# Patient Record
Sex: MALE | Race: WHITE | NOT HISPANIC OR LATINO | Employment: FULL TIME | URBAN - METROPOLITAN AREA
[De-identification: names, ages, dates, MRNs, and addresses within clinical notes are randomized per-mention and may not be internally consistent; named-entity substitution may affect disease eponyms.]

---

## 2021-01-12 ENCOUNTER — APPOINTMENT (EMERGENCY)
Dept: CT IMAGING | Facility: HOSPITAL | Age: 61
DRG: 066 | End: 2021-01-12
Payer: COMMERCIAL

## 2021-01-12 ENCOUNTER — HOSPITAL ENCOUNTER (INPATIENT)
Facility: HOSPITAL | Age: 61
LOS: 1 days | Discharge: HOME | DRG: 066 | End: 2021-01-14
Attending: EMERGENCY MEDICINE | Admitting: INTERNAL MEDICINE
Payer: COMMERCIAL

## 2021-01-12 ENCOUNTER — APPOINTMENT (OUTPATIENT)
Dept: MRI IMAGING | Facility: HOSPITAL | Age: 61
DRG: 066 | End: 2021-01-12
Payer: COMMERCIAL

## 2021-01-12 DIAGNOSIS — I63.431 CEREBROVASCULAR ACCIDENT (CVA) DUE TO EMBOLISM OF RIGHT POSTERIOR CEREBRAL ARTERY (HCC): ICD-10-CM

## 2021-01-12 DIAGNOSIS — R29.90 STROKE-LIKE SYMPTOMS: Primary | ICD-10-CM

## 2021-01-12 DIAGNOSIS — H57.9 VISUAL COMPLAINT: ICD-10-CM

## 2021-01-12 DIAGNOSIS — I63.40 EMBOLIC CEREBRAL INFARCTION (HCC): ICD-10-CM

## 2021-01-12 PROBLEM — F41.9 ANXIETY: Chronic | Status: ACTIVE | Noted: 2021-01-12

## 2021-01-12 LAB
ANION GAP SERPL CALCULATED.3IONS-SCNC: 6 MMOL/L (ref 4–13)
ATRIAL RATE: 73 BPM
BASOPHILS # BLD AUTO: 0.03 THOUSANDS/ΜL (ref 0–0.1)
BASOPHILS NFR BLD AUTO: 0 % (ref 0–1)
BUN SERPL-MCNC: 11 MG/DL (ref 5–25)
CALCIUM SERPL-MCNC: 9 MG/DL (ref 8.3–10.1)
CHLORIDE SERPL-SCNC: 104 MMOL/L (ref 100–108)
CO2 SERPL-SCNC: 27 MMOL/L (ref 21–32)
CREAT SERPL-MCNC: 0.75 MG/DL (ref 0.6–1.3)
EOSINOPHIL # BLD AUTO: 0.02 THOUSAND/ΜL (ref 0–0.61)
EOSINOPHIL NFR BLD AUTO: 0 % (ref 0–6)
ERYTHROCYTE [DISTWIDTH] IN BLOOD BY AUTOMATED COUNT: 12.2 % (ref 11.6–15.1)
GFR SERPL CREATININE-BSD FRML MDRD: 100 ML/MIN/1.73SQ M
GLUCOSE SERPL-MCNC: 119 MG/DL (ref 65–140)
HCT VFR BLD AUTO: 47.5 % (ref 36.5–49.3)
HGB BLD-MCNC: 16.4 G/DL (ref 12–17)
IMM GRANULOCYTES # BLD AUTO: 0.01 THOUSAND/UL (ref 0–0.2)
IMM GRANULOCYTES NFR BLD AUTO: 0 % (ref 0–2)
LYMPHOCYTES # BLD AUTO: 0.97 THOUSANDS/ΜL (ref 0.6–4.47)
LYMPHOCYTES NFR BLD AUTO: 13 % (ref 14–44)
MCH RBC QN AUTO: 31.2 PG (ref 26.8–34.3)
MCHC RBC AUTO-ENTMCNC: 34.5 G/DL (ref 31.4–37.4)
MCV RBC AUTO: 91 FL (ref 82–98)
MONOCYTES # BLD AUTO: 0.45 THOUSAND/ΜL (ref 0.17–1.22)
MONOCYTES NFR BLD AUTO: 6 % (ref 4–12)
NEUTROPHILS # BLD AUTO: 6.31 THOUSANDS/ΜL (ref 1.85–7.62)
NEUTS SEG NFR BLD AUTO: 81 % (ref 43–75)
NRBC BLD AUTO-RTO: 0 /100 WBCS
P AXIS: 60 DEGREES
PLATELET # BLD AUTO: 206 THOUSANDS/UL (ref 149–390)
PMV BLD AUTO: 9.7 FL (ref 8.9–12.7)
POTASSIUM SERPL-SCNC: 4.4 MMOL/L (ref 3.5–5.3)
PR INTERVAL: 136 MS
QRS AXIS: -58 DEGREES
QRSD INTERVAL: 100 MS
QT INTERVAL: 382 MS
QTC INTERVAL: 413 MS
RBC # BLD AUTO: 5.25 MILLION/UL (ref 3.88–5.62)
SODIUM SERPL-SCNC: 137 MMOL/L (ref 136–145)
T WAVE AXIS: 56 DEGREES
VENTRICULAR RATE: 70 BPM
WBC # BLD AUTO: 7.79 THOUSAND/UL (ref 4.31–10.16)

## 2021-01-12 PROCEDURE — 99219 PR INITIAL OBSERVATION CARE/DAY 50 MINUTES: CPT | Performed by: INTERNAL MEDICINE

## 2021-01-12 PROCEDURE — 93010 ELECTROCARDIOGRAM REPORT: CPT | Performed by: INTERNAL MEDICINE

## 2021-01-12 PROCEDURE — 99285 EMERGENCY DEPT VISIT HI MDM: CPT | Performed by: EMERGENCY MEDICINE

## 2021-01-12 PROCEDURE — 70496 CT ANGIOGRAPHY HEAD: CPT

## 2021-01-12 PROCEDURE — 99205 OFFICE O/P NEW HI 60 MIN: CPT | Performed by: PSYCHIATRY & NEUROLOGY

## 2021-01-12 PROCEDURE — 80048 BASIC METABOLIC PNL TOTAL CA: CPT | Performed by: EMERGENCY MEDICINE

## 2021-01-12 PROCEDURE — 70498 CT ANGIOGRAPHY NECK: CPT

## 2021-01-12 PROCEDURE — 70551 MRI BRAIN STEM W/O DYE: CPT

## 2021-01-12 PROCEDURE — 36415 COLL VENOUS BLD VENIPUNCTURE: CPT | Performed by: EMERGENCY MEDICINE

## 2021-01-12 PROCEDURE — G1004 CDSM NDSC: HCPCS

## 2021-01-12 PROCEDURE — 99285 EMERGENCY DEPT VISIT HI MDM: CPT

## 2021-01-12 PROCEDURE — 85025 COMPLETE CBC W/AUTO DIFF WBC: CPT | Performed by: EMERGENCY MEDICINE

## 2021-01-12 PROCEDURE — 93005 ELECTROCARDIOGRAM TRACING: CPT

## 2021-01-12 PROCEDURE — 96374 THER/PROPH/DIAG INJ IV PUSH: CPT

## 2021-01-12 RX ORDER — ASPIRIN 81 MG/1
81 TABLET, CHEWABLE ORAL DAILY
Status: DISCONTINUED | OUTPATIENT
Start: 2021-01-13 | End: 2021-01-14 | Stop reason: HOSPADM

## 2021-01-12 RX ORDER — ONDANSETRON 2 MG/ML
4 INJECTION INTRAMUSCULAR; INTRAVENOUS EVERY 6 HOURS PRN
Status: DISCONTINUED | OUTPATIENT
Start: 2021-01-12 | End: 2021-01-14 | Stop reason: HOSPADM

## 2021-01-12 RX ORDER — ATORVASTATIN CALCIUM 40 MG/1
40 TABLET, FILM COATED ORAL EVERY EVENING
Status: DISCONTINUED | OUTPATIENT
Start: 2021-01-13 | End: 2021-01-14 | Stop reason: HOSPADM

## 2021-01-12 RX ORDER — LORAZEPAM 2 MG/ML
0.5 INJECTION INTRAMUSCULAR ONCE
Status: COMPLETED | OUTPATIENT
Start: 2021-01-12 | End: 2021-01-12

## 2021-01-12 RX ORDER — LORAZEPAM 0.5 MG/1
0.5 TABLET ORAL EVERY 8 HOURS PRN
Status: DISCONTINUED | OUTPATIENT
Start: 2021-01-12 | End: 2021-01-14 | Stop reason: HOSPADM

## 2021-01-12 RX ORDER — ACETAMINOPHEN 325 MG/1
650 TABLET ORAL EVERY 6 HOURS PRN
Status: DISCONTINUED | OUTPATIENT
Start: 2021-01-12 | End: 2021-01-14 | Stop reason: HOSPADM

## 2021-01-12 RX ORDER — ASPIRIN 81 MG/1
324 TABLET, CHEWABLE ORAL ONCE
Status: COMPLETED | OUTPATIENT
Start: 2021-01-12 | End: 2021-01-12

## 2021-01-12 RX ORDER — LORAZEPAM 2 MG/ML
1 INJECTION INTRAMUSCULAR ONCE
Status: COMPLETED | OUTPATIENT
Start: 2021-01-12 | End: 2021-01-12

## 2021-01-12 RX ADMIN — SODIUM CHLORIDE, SODIUM LACTATE, POTASSIUM CHLORIDE, AND CALCIUM CHLORIDE 500 ML: .6; .31; .03; .02 INJECTION, SOLUTION INTRAVENOUS at 13:58

## 2021-01-12 RX ADMIN — ASPIRIN 81 MG CHEWABLE TABLET 324 MG: 81 TABLET CHEWABLE at 13:56

## 2021-01-12 RX ADMIN — IOHEXOL 100 ML: 350 INJECTION, SOLUTION INTRAVENOUS at 11:20

## 2021-01-12 RX ADMIN — ENOXAPARIN SODIUM 40 MG: 40 INJECTION SUBCUTANEOUS at 18:22

## 2021-01-12 RX ADMIN — LORAZEPAM 0.5 MG: 2 INJECTION INTRAMUSCULAR; INTRAVENOUS at 12:55

## 2021-01-12 RX ADMIN — LORAZEPAM 1 MG: 2 INJECTION INTRAMUSCULAR; INTRAVENOUS at 20:09

## 2021-01-12 NOTE — ASSESSMENT & PLAN NOTE
· Extremely anxious, which is situational as he has not been in the hospital and is anxious about being in the hospital at this time  · Ativan p r n

## 2021-01-12 NOTE — ASSESSMENT & PLAN NOTE
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

## 2021-01-12 NOTE — ED PROVIDER NOTES
History  Chief Complaint   Patient presents with    CVA/TIA-like Symptoms     c/o left visual blurriness, lightheadedness after experiencing a headache on Friday night  Pt also c/o on/off lower lip numbness and LUE "weirdness" since 0100 this morning  Pt denies HA or lip numbness at present time  62 yr male-- with no pmh- this past sat with episode of central clouding vision -- no eye pain- flashes/ floaters-- no amaurosis/ no visual field cut-- awhile lateral with gradual frontal headache-- non thunderclap - that resolved with 2 otc ibuprofen-- no hx of headache syndromes--  also c/o intermitent lightheadedness- no near syncope- vertigo-- no cp/sob/palp/ neck pain/ neck injury -- also with sense of tingling aroudnd left side of mouth -- which has reaolved- no facial weakness- no focal weakness- no other comps       History provided by:  Patient   used: No        None       History reviewed  No pertinent past medical history  History reviewed  No pertinent surgical history  History reviewed  No pertinent family history  I have reviewed and agree with the history as documented  E-Cigarette/Vaping     E-Cigarette/Vaping Substances     Social History     Tobacco Use    Smoking status: Never Smoker    Smokeless tobacco: Never Used   Substance Use Topics    Alcohol use: Not Currently    Drug use: Never       Review of Systems   Constitutional: Negative  HENT: Negative  Eyes: Positive for visual disturbance  Negative for photophobia, pain, discharge, redness and itching  Respiratory: Negative  Cardiovascular: Negative  Gastrointestinal: Negative  Endocrine: Negative  Genitourinary: Negative  Musculoskeletal: Negative  Skin: Negative  Allergic/Immunologic: Negative  Neurological: Positive for light-headedness and headaches  Negative for dizziness, tremors, seizures, syncope, facial asymmetry, speech difficulty, weakness and numbness     Hematological: Negative  Psychiatric/Behavioral: Negative  Physical Exam  Physical Exam  Vitals signs and nursing note reviewed  Constitutional:       General: He is not in acute distress  Appearance: Normal appearance  He is not ill-appearing, toxic-appearing or diaphoretic  Comments: avss-- htnsive-- pulse ox 96 % on ra- interpretation is normal- no intervention - well appearing- in nad    HENT:      Head: Normocephalic and atraumatic  Comments: No bilateral maxillary/fontal sinus tendenrness- edema/ erythema- no tmj tenderness- no bilateral temporal artery area tenderness/edema/ erythema/ nodularity      Right Ear: Tympanic membrane, ear canal and external ear normal  There is no impacted cerumen  Left Ear: Tympanic membrane, ear canal and external ear normal  There is no impacted cerumen  Nose: Nose normal  No congestion or rhinorrhea  Mouth/Throat:      Mouth: Mucous membranes are moist       Pharynx: Oropharynx is clear  No oropharyngeal exudate or posterior oropharyngeal erythema  Eyes:      General: No scleral icterus  Right eye: No discharge  Left eye: No discharge  Extraocular Movements: Extraocular movements intact  Conjunctiva/sclera: Conjunctivae normal       Pupils: Pupils are equal, round, and reactive to light  Comments: Mm pink=- no papilledema bilaterally    Neck:      Musculoskeletal: Normal range of motion and neck supple  No neck rigidity or muscular tenderness  Vascular: No carotid bruit  Comments: No pmt c/t/l/s spine - no carotid bruits  Cardiovascular:      Rate and Rhythm: Normal rate and regular rhythm  Pulses: Normal pulses  Heart sounds: Normal heart sounds  No murmur  No gallop  Pulmonary:      Effort: Pulmonary effort is normal  No respiratory distress  Breath sounds: Normal breath sounds  No stridor  No wheezing, rhonchi or rales  Chest:      Chest wall: No tenderness     Abdominal:      General: Bowel sounds are normal  There is no distension  Palpations: Abdomen is soft  There is no mass  Tenderness: There is no abdominal tenderness  There is no right CVA tenderness, left CVA tenderness, guarding or rebound  Hernia: No hernia is present  Comments: Soft nt/nd- no hsm/ no cva tendenrness- no peritoneal signs- no pulsatile abd mass/bruit/ tenderness   Musculoskeletal: Normal range of motion  General: No swelling, tenderness, deformity or signs of injury  Right lower leg: No edema  Left lower leg: No edema  Comments: Equal bilateral radial/dp pulses- no ble edema/calf tenderness/asym/ erythema   Lymphadenopathy:      Cervical: No cervical adenopathy  Skin:     General: Skin is warm  Capillary Refill: Capillary refill takes less than 2 seconds  Coloration: Skin is not jaundiced or pale  Findings: No bruising, erythema, lesion or rash  Neurological:      General: No focal deficit present  Mental Status: He is alert and oriented to person, place, and time  Mental status is at baseline  Cranial Nerves: No cranial nerve deficit  Sensory: No sensory deficit  Motor: No weakness        Coordination: Coordination normal       Gait: Gait normal       Deep Tendon Reflexes: Reflexes normal       Comments: No nystagmus/ neg test of sckew/  Normal finger to nose  Heal to shin all bilaterally / normal gait- no clonus-- normal visual fields to confrontation    Psychiatric:         Behavior: Behavior normal       Comments: Mildly anxious          Vital Signs  ED Triage Vitals [01/12/21 0905]   Temperature Pulse Respirations Blood Pressure SpO2   98 7 °F (37 1 °C) 78 18 (!) 187/99 96 %      Temp Source Heart Rate Source Patient Position - Orthostatic VS BP Location FiO2 (%)   Oral Monitor Lying Left arm --      Pain Score       --           Vitals:    01/12/21 0905   BP: (!) 187/99   Pulse: 78   Patient Position - Orthostatic VS: Lying Visual Acuity      ED Medications  Medications   lactated ringers bolus 500 mL (has no administration in time range)       Diagnostic Studies  Results Reviewed     Procedure Component Value Units Date/Time    CBC and differential [[id number]] Collected: 01/12/21 1009    Lab Status: No result Specimen: Blood from Arm, Right     Basic metabolic panel [[id number]] Collected: 01/12/21 1009    Lab Status: No result Specimen: Blood from Arm, Right                  CTA head and neck w wo contrast    (Results Pending)              Procedures  Procedures         ED Course  ED Course as of Jan 17 1247   Tue Jan 12, 2021   1212 Er md note- case d/w-  dr Keren Givens - neurology on call - who reviewed  scans and would recommend admission for workup       1304 - ER MD NOTE- DISCUSSED CT SCAN FINDINGS AND NEUROLOGY RECOMMENDATIONS FOR ADMIT AND WORKUP--- AFTER DISCUSSION PT BECAME EXTREMELY ANXIOUS--  /NEAR SYNCOPE- WITH NSR ON MONITOR--  PT THEN C/O LEFT ARM NUMBNESS--  PT GIVEN ATIVAN IV -- AND WILL RE-EVAL       1339 - ER MD NOTE- PT - RE-EVALUATED - RESTING IN NAD- WIFE AWARE OF PENDING ADMIT                                               MDM    Disposition  Final diagnoses:   None     ED Disposition     None      Follow-up Information    None         Patient's Medications    No medications on file     No discharge procedures on file      PDMP Review     None          ED Provider  Electronically Signed by           Hemalatha Kenney MD  01/12/21 1201 [facility]  01/17/21 5832

## 2021-01-12 NOTE — ED PROCEDURE NOTE
PROCEDURE  ECG 12 Lead Documentation Only    Date/Time: 1/12/2021 10:06 AM  Performed by: Sarika Bardales MD  Authorized by: Sarika Bardales MD     Indications / Diagnosis:  Lightheadedness  ECG reviewed by me, the ED Provider: yes    Patient location:  ED and bedside  Previous ECG:     Previous ECG:  Unavailable    Comparison to cardiac monitor: Yes    Interpretation:     Interpretation: non-specific    Rate:     ECG rate:  70    ECG rate assessment: normal    Rhythm:     Rhythm: sinus rhythm    Ectopy:     Ectopy: none    QRS:     QRS axis:  Left    QRS intervals:  Normal  Conduction:     Conduction: abnormal      Abnormal conduction: incomplete RBBB    ST segments:     ST segments:  Normal  T waves:     T waves: flattening      Flattening:  V1 and aVL  Q waves:     Q waves:  V1  Other findings:     Other findings: U wave    Comments:      No ecg signs of ischemia/ injury         Sarika Bardales MD  01/12/21 1010

## 2021-01-12 NOTE — PLAN OF CARE
Problem: PAIN - ADULT  Goal: Verbalizes/displays adequate comfort level or baseline comfort level  Description: Interventions:  - Encourage patient to monitor pain and request assistance  - Assess pain using appropriate pain scale  - Administer analgesics based on type and severity of pain and evaluate response  - Implement non-pharmacological measures as appropriate and evaluate response  - Consider cultural and social influences on pain and pain management  - Notify physician/advanced practitioner if interventions unsuccessful or patient reports new pain  Outcome: Progressing     Problem: INFECTION - ADULT  Goal: Absence or prevention of progression during hospitalization  Description: INTERVENTIONS:  - Assess and monitor for signs and symptoms of infection  - Monitor lab/diagnostic results  - Monitor all insertion sites, i e  indwelling lines, tubes, and drains  - Monitor endotracheal if appropriate and nasal secretions for changes in amount and color  - [place] appropriate cooling/warming therapies per order  - Administer medications as ordered  - Instruct and encourage patient and family to use good hand hygiene technique  - Identify and instruct in appropriate isolation precautions for identified infection/condition  Outcome: Progressing  Goal: Absence of fever/infection during neutropenic period  Description: INTERVENTIONS:  - Monitor WBC    Outcome: Progressing     Problem: SAFETY ADULT  Goal: Patient will remain free of falls  Description: INTERVENTIONS:  - Assess patient frequently for physical needs  -  Identify cognitive and physical deficits and behaviors that affect risk of falls    -  [place] fall precautions as indicated by assessment   - Educate patient/family on patient safety including physical limitations  - Instruct patient to call for assistance with activity based on assessment  - Modify environment to reduce risk of injury  - Consider OT/PT consult to assist with strengthening/mobility  Outcome: Progressing  Goal: Maintain or return to baseline ADL function  Description: INTERVENTIONS:  -  Assess patient's ability to carry out ADLs; assess patient's baseline for ADL function and identify physical deficits which impact ability to perform ADLs (bathing, care of mouth/teeth, toileting, grooming, dressing, etc )  - Assess/evaluate cause of self-care deficits   - Assess range of motion  - Assess patient's mobility; develop plan if impaired  - Assess patient's need for assistive devices and provide as appropriate  - Encourage maximum independence but intervene and supervise when necessary  - Involve family in performance of ADLs  - Assess for home care needs following discharge   - Consider OT consult to assist with ADL evaluation and planning for discharge  - Provide patient education as appropriate  Outcome: Progressing  Goal: Maintain or return mobility status to optimal level  Description: INTERVENTIONS:  - Assess patient's baseline mobility status (ambulation, transfers, stairs, etc )    - Identify cognitive and physical deficits and behaviors that affect mobility  - Identify mobility aids required to assist with transfers and/or ambulation (gait belt, sit-to-stand, lift, walker, cane, etc )  - [place] fall precautions as indicated by assessment  - Record patient progress and toleration of activity level on Mobility SBAR; progress patient to next Phase/Stage  - Instruct patient to call for assistance with activity based on assessment  - Consider rehabilitation consult to assist with strengthening/weightbearing, etc   Outcome: Progressing     Problem: DISCHARGE PLANNING  Goal: Discharge to home or other facility with appropriate resources  Description: INTERVENTIONS:  - Identify barriers to discharge w/patient and caregiver  - Arrange for needed discharge resources and transportation as appropriate  - Identify discharge learning needs (meds, wound care, etc )  - Arrange for interpretive services to assist at discharge as needed  - Refer to Case Management Department for coordinating discharge planning if the patient needs post-hospital services based on physician/advanced practitioner order or complex needs related to functional status, cognitive ability, or social support system  Outcome: Progressing     Problem: Knowledge Deficit  Goal: Patient/family/caregiver demonstrates understanding of disease process, treatment plan, medications, and discharge instructions  Description: Complete learning assessment and assess knowledge base    Interventions:  - Provide teaching at level of understanding  - Provide teaching via preferred learning methods  Outcome: Progressing

## 2021-01-12 NOTE — CONSULTS
Consultation - Neurology   Cristiana Castillo 62 y o  male MRN: [id number]  Unit/Bed#: S -01 Encounter: [id number]      Assessment/Plan   * Stroke-like symptoms  Assessment & Plan  Cristiana Castillo is a 62 y o  male with anxiety who presents to [facility] ED on 01/12/2021 with blurry vision of left eye  Strong suspicion for right PCA infarct given CT head findings and evidence of left homonymous hemianopsia on exam  Will admit on stroke pathway and obtain MRI brain  Plan:  - MRI brain pending  - Recommend Echo  - Recommend:  Lipid panel, hemoglobin A1c  - S/p  mg x 1  - Continue on ASA 81 mg daily starting 01/13/2021  - Initiate atorvastatin 40 mg daily  - Normotension, euglycemia, normothermia  - Telemetry  - Frequent neuro checks  - PT/OT/speech  - Stroke education  - Medical management and supportive care per primary team, notify with changes  - Will likely need a [employer] form to be completed    Imaging/Labs:  - CTA head and neck 01/12/2021:  · Questionable early ischemia/infarct in the right PCA territory involving the right medial occipital lobe  · 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  · Mild cervical carotid atherosclerotic disease    Anxiety  Assessment & Plan  - S/p IV Ativan 0 5 mg x 1  - Medical management per primary team    Recommendations for outpatient neurological follow up have yet to be determined  History of Present Illness     Reason for Consult / Principal Problem:  Stroke like symptoms    HPI: Cristiana Castillo is a 62 y o   male with anxiety who presents to [facility] ED on 01/12/2021 with blurry vision of left eye  Patient reports on Saturday 01/09/2021 patient developed significant blurriness in left eye, with some blurriness in right eye, as well as a headache  Patient reported the headache was behind his right eye and in the posterior aspect his head on the right    He states that the headache was 4/10 in severity and improved with Advil  Patient states he felt his blurry vision also improved with the Advil  Patient reports that headache was only present on Saturday 01/09, however his blurry vision has persisted  Patient reports he developed left upper extremity numbness earlier today 01/12/2021, which improved by the time he got to the ED  Denies any focal weakness, cardiac history, episodes of abnormal heart rhythms/tachycardia, palpitations  Patient reports that he has a PCP, however has not seen his PCP in the last 2 years  Patient states that his blood pressure is on the higher and, however does not routine measure his pressures at home  Denies headache history  Patient presented to [facility] ED on 01/12/2021 with an elevated BP of 187/99  CTA head and neck revealed questionable early ischemia/infarct in the right PCA territory involving right medial occipital lobe, as well as abrupt termination of the right proximal P2 segment of the posterior cerebral artery  On exam patient initially denied any visual deficits, however on examination patient realized he had the left visual field deficits  Patient currently denies CP, SOB, headache, dizziness, N/V, abdominal pain, weakness or numbness  Inpatient consult to Neurology  Consult performed by: Sandra Ware PA-C  Consult ordered by: Nancy Croft PA-C        Review of Systems  12 point ROS performed, as stated above, all others negative  Historical Information   History reviewed  No pertinent past medical history  History reviewed  No pertinent surgical history  Social History   Social History     Substance and Sexual Activity   Alcohol Use Not Currently     Social History     Substance and Sexual Activity   Drug Use Never     E-Cigarette/Vaping     E-Cigarette/Vaping Substances     Social History     Tobacco Use   Smoking Status Never Smoker   Smokeless Tobacco Never Used     Family History: History reviewed   No pertinent family history  Review of previous medical records was completed  Meds/Allergies   all current active meds have been reviewed, current meds:   Current Facility-Administered Medications   Medication Dose Route Frequency    acetaminophen (TYLENOL) tablet 650 mg  650 mg Oral Q6H PRN    [START ON 1/13/2021] aspirin chewable tablet 81 mg  81 mg Oral Daily    [START ON 1/13/2021] atorvastatin (LIPITOR) tablet 40 mg  40 mg Oral QPM    enoxaparin (LOVENOX) subcutaneous injection 40 mg  40 mg Subcutaneous Daily    LORazepam (ATIVAN) injection 1 mg  1 mg Intravenous Once    LORazepam (ATIVAN) tablet 0 5 mg  0 5 mg Oral Q8H PRN    ondansetron (ZOFRAN) injection 4 mg  4 mg Intravenous Q6H PRN   , PTA meds:   None    and     No Known Allergies    Objective   Vitals:Blood pressure 158/96, pulse 65, temperature (!) 97 4 °F (36 3 °C), temperature source Oral, resp  rate 18, height 6' (1 829 m), SpO2 96 %  ,There is no height or weight on file to calculate BMI  No intake or output data in the 24 hours ending 01/12/21 5113    Invasive Devices: Invasive Devices     Peripheral Intravenous Line            Peripheral IV 01/12/21 Right Antecubital less than 1 day                Physical Exam  Vitals signs and nursing note reviewed  Constitutional:       General: He is not in acute distress  Appearance: Normal appearance  He is normal weight  He is not ill-appearing, toxic-appearing or diaphoretic  HENT:      Head: Normocephalic and atraumatic  Eyes:      General: No scleral icterus  Right eye: No discharge  Left eye: No discharge  Extraocular Movements: Extraocular movements intact  Conjunctiva/sclera: Conjunctivae normal       Pupils: Pupils are equal, round, and reactive to light  Neck:      Musculoskeletal: Normal range of motion and neck supple  Cardiovascular:      Comments: Normal rate, regular rhythm on telemetry  Pulmonary:      Effort: No respiratory distress  Musculoskeletal: Normal range of motion  Skin:     General: Skin is warm and dry  Coloration: Skin is not jaundiced or pale  Findings: No bruising, erythema, lesion or rash  Neurological:      Mental Status: He is alert  Psychiatric:      Comments: Anxious on exam       Neurologic Exam     Mental Status   Patient is alert, sitting up in bed  Oriented x3  No dysarthria or aphasia noted  Able to name all objects provided, follows multistep commands, and answers all questions appropriately  Cranial Nerves     CN III, IV, VI   Pupils are equal, round, and reactive to light  Nystagmus: none   Upgaze: normal  Downgaze: normal  Conjugate gaze: present    CN V   Facial sensation intact  CN VII   Facial expression full, symmetric  CN VIII   Hearing: intact    CN XI   CN XI normal      CN XII   Tongue deviation: none  Left homonymous hemianopsia noted on exam     Motor Exam   Muscle bulk: normal  Overall muscle tone: normal  Right arm pronator drift: absent  Left arm pronator drift: absent   strength symmetric, 5/5 bilaterally  Bilateral upper and lower extremity strength testing 5/5 throughout     Sensory Exam   Light touch normal    Temperature sensation intact throughout     Gait, Coordination, and Reflexes     Tremor   Resting tremor: absent    Reflexes   Right ankle clonus: absent  Left ankle clonus: absent  No ataxia or dysmetria on bilateral finger-to-nose testing  Bilateral upper extremity reflexes 1+ throughout  Bilateral patellar reflexes 2+  Bilateral Achilles reflexes trace  Gait WNL     Lab Results: I have personally reviewed pertinent reports    Recent Results (from the past 24 hour(s))   ECG 12 lead    Collection Time: 01/12/21 10:01 AM   Result Value Ref Range    Ventricular Rate 70 BPM    Atrial Rate 73 BPM    NV Interval 136 ms    QRSD Interval 100 ms    QT Interval 382 ms    QTC Interval 413 ms    P Axis 60 degrees    QRS Axis -58 degrees    T Wave Axis 56 degrees   CBC and differential    Collection Time: 01/12/21 10:09 AM   Result Value Ref Range    WBC 7 79 4 31 - 10 16 Thousand/uL    RBC 5 25 3 88 - 5 62 Million/uL    Hemoglobin 16 4 12 0 - 17 0 g/dL    Hematocrit 47 5 36 5 - 49 3 %    MCV 91 82 - 98 fL    MCH 31 2 26 8 - 34 3 pg    MCHC 34 5 31 4 - 37 4 g/dL    RDW 12 2 11 6 - 15 1 %    MPV 9 7 8 9 - 12 7 fL    Platelets 252 505 - 417 Thousands/uL    nRBC 0 /100 WBCs    Neutrophils Relative 81 (H) 43 - 75 %    Immat GRANS % 0 0 - 2 %    Lymphocytes Relative 13 (L) 14 - 44 %    Monocytes Relative 6 4 - 12 %    Eosinophils Relative 0 0 - 6 %    Basophils Relative 0 0 - 1 %    Neutrophils Absolute 6 31 1 85 - 7 62 Thousands/µL    Immature Grans Absolute 0 01 0 00 - 0 20 Thousand/uL    Lymphocytes Absolute 0 97 0 60 - 4 47 Thousands/µL    Monocytes Absolute 0 45 0 17 - 1 22 Thousand/µL    Eosinophils Absolute 0 02 0 00 - 0 61 Thousand/µL    Basophils Absolute 0 03 0 00 - 0 10 Thousands/µL   Basic metabolic panel    Collection Time: 01/12/21 10:09 AM   Result Value Ref Range    Sodium 137 136 - 145 mmol/L    Potassium 4 4 3 5 - 5 3 mmol/L    Chloride 104 100 - 108 mmol/L    CO2 27 21 - 32 mmol/L    ANION GAP 6 4 - 13 mmol/L    BUN 11 5 - 25 mg/dL    Creatinine 0 75 0 60 - 1 30 mg/dL    Glucose 119 65 - 140 mg/dL    Calcium 9 0 8 3 - 10 1 mg/dL    eGFR 100 ml/min/1 73sq m   ]  Imaging Studies: I have personally reviewed pertinent reports and I have personally reviewed pertinent films in PACS  EKG, Pathology, and Other Studies: I have personally reviewed pertinent reports      VTE Prophylaxis:  Will be placed on DVT prophylaxis once admitted to the floor

## 2021-01-12 NOTE — H&P
[address] - Internal Medicine Service  H&P- Wendy Dye 1960, 62 y o  male MRN: [id number]  Unit/Bed#: FT 02 Encounter: [id number]  Primary Care Provider: Noelle Moralez DO   Date and time admitted to hospital: 1/12/2021  9:11 AM    * Stroke-like symptoms  Assessment & Plan  · Initially presented with headache, left eye visual abnormality and left arm weakness with some left lip tingling  Majority of his symptoms have resolved besides some left eye blurriness  · CTA: "Questionable early ischemia/infarction in the right PCA territory involving the right medial occipital lobe  Follow-up MR imaging is recommended  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  Mild cervical carotid atherosclerotic disease "   Stroke Pathway   o Normotensive BP is acceptable  o Neuro checks  o Telemetry  o Check lipid panel and A1c   o Start Aspirin and statin   Obtain MRI brain  [facility]  Anxiety  Assessment & Plan  · Extremely anxious, which is situational as he has not been in the hospital and is anxious about being in the hospital at this time  · Ativan p r n  VTE Prophylaxis: Enoxaparin (Lovenox)  / sequential compression device   Code Status: FULL CODE  POLST: POLST form is not discussed and not completed at this time  Discussion with family: patient and wife at bedside     Anticipated Length of Stay:  Patient will be admitted on an Observation basis with an anticipated length of stay of < 2 midnights  Justification for Hospital Stay: stroke like symptoms with abnormal CTA    Total Time for Visit, including Counseling / Coordination of Care: 1 hour  Greater than 50% of this total time spent on direct patient counseling and coordination of care      Chief Complaint:   Left eye blurriness    History of Present Illness:    Wendy Dye is a 62 y o  male who presents with onset of left eye blurriness as well as headache, left lip tingling and some left arm numbness  All of his symptoms started on Sunday and have resolved, however he does have some left eye blurriness at this point  Reports resolution in his headache  Denies any presyncopal symptoms, syncope, chest pain, lightheadedness, dizziness  Reports he does feel anxious at this time  No history of atrial fibrillation or blood clotting  Has never had neurologic symptoms like this in the past   He does not take medications and is otherwise healthy  Review of Systems:    Review of Systems   Constitutional: Negative for activity change, appetite change, chills, fatigue and fever  HENT: Negative for congestion, rhinorrhea, sinus pressure and sore throat  Eyes: Positive for visual disturbance  Negative for photophobia and pain  Respiratory: Negative for cough, shortness of breath and wheezing  Cardiovascular: Negative for chest pain, palpitations and leg swelling  Gastrointestinal: Negative for abdominal distention, abdominal pain, constipation, diarrhea, nausea and vomiting  Endocrine: Negative for cold intolerance, heat intolerance, polydipsia and polyuria  Genitourinary: Negative for difficulty urinating, dysuria, flank pain, frequency and hematuria  Musculoskeletal: Negative for arthralgias, back pain and joint swelling  Skin: Negative for color change, pallor and rash  Allergic/Immunologic: Negative  Neurological: Positive for numbness and headaches  Negative for dizziness, syncope, weakness and light-headedness  Hematological: Negative  Psychiatric/Behavioral: Negative  Past Medical and Surgical History:     History reviewed  No pertinent past medical history  History reviewed  No pertinent surgical history  Meds/Allergies:    Prior to Admission medications    Not on File     I have reviewed home medications with patient personally      Allergies: No Known Allergies    Social History: Marital Status: /Civil Union   Occupation: non-contributory  Patient Pre-hospital Living Situation: home  Patient Pre-hospital Level of Mobility: full  Patient Pre-hospital Diet Restrictions: none  Substance Use History:   Social History     Substance and Sexual Activity   Alcohol Use Not Currently     Social History     Tobacco Use   Smoking Status Never Smoker   Smokeless Tobacco Never Used     Social History     Substance and Sexual Activity   Drug Use Never       Family History:    non-contributory    Physical Exam:     Vitals:   Blood Pressure: 148/67 (01/12/21 1300)  Pulse: 66 (01/12/21 1300)  Temperature: 98 7 °F (37 1 °C) (01/12/21 0905)  Temp Source: Oral (01/12/21 0905)  Respirations: 16 (01/12/21 1259)  Height: 6' (182 9 cm) (01/12/21 0905)  SpO2: 95 % (01/12/21 1300)    Physical Exam  Vitals signs and nursing note reviewed  Constitutional:       General: He is not in acute distress  Appearance: Normal appearance  HENT:      Head: Normocephalic and atraumatic  Mouth/Throat:      Mouth: Mucous membranes are moist    Eyes:      General: No scleral icterus  Extraocular Movements: Extraocular movements intact  Pupils: Pupils are equal, round, and reactive to light  Neck:      Musculoskeletal: Normal range of motion and neck supple  Cardiovascular:      Rate and Rhythm: Normal rate and regular rhythm  Heart sounds: Normal heart sounds  No murmur  No friction rub  Pulmonary:      Effort: Pulmonary effort is normal  No respiratory distress  Breath sounds: Normal breath sounds  No wheezing, rhonchi or rales  Abdominal:      General: Bowel sounds are normal  There is no distension  Palpations: Abdomen is soft  Tenderness: There is no abdominal tenderness  There is no guarding  Musculoskeletal:         General: No swelling or deformity  Right lower leg: No edema  Left lower leg: No edema  Skin:     General: Skin is warm and dry        Capillary Refill: Capillary refill takes less than 2 seconds  Coloration: Skin is not jaundiced or pale  Neurological:      General: No focal deficit present  Mental Status: He is alert and oriented to person, place, and time  Mental status is at baseline  Additional Data:     Lab Results: I have personally reviewed pertinent reports  Results from last 7 days   Lab Units 01/12/21  1009   WBC Thousand/uL 7 79   HEMOGLOBIN g/dL 16 4   HEMATOCRIT % 47 5   PLATELETS Thousands/uL 206   NEUTROS PCT % 81*   LYMPHS PCT % 13*   MONOS PCT % 6   EOS PCT % 0     Results from last 7 days   Lab Units 01/12/21  1009   SODIUM mmol/L 137   POTASSIUM mmol/L 4 4   CHLORIDE mmol/L 104   CO2 mmol/L 27   BUN mg/dL 11   CREATININE mg/dL 0 75   ANION GAP mmol/L 6   CALCIUM mg/dL 9 0   GLUCOSE RANDOM mg/dL 119                       Imaging: I have personally reviewed pertinent reports  CTA head and neck w wo contrast   Final Result by Maris Campbell MD (01/12 3151)      Questionable early ischemia/infarction in the right PCA territory involving the right medial occipital lobe  Follow-up MR imaging is recommended  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

## 2021-01-12 NOTE — ASSESSMENT & PLAN NOTE
Keaton Hernandez is a 62 y o  male with anxiety who presents to [facility] ED on 01/12/2021 with blurry vision of left eye  Acute right occipital cortical infarcts noted on MRI brain with evidence of patchy left homonymous hemianopsia on exam   CTA head and neck without critical stenosis, less likely atheroembolic  Etiology likely cardioembolic, will likely obtain TIFFANY to rule out central thrombus/PFO  Patient will need loop monitor placement within 1 week of discharge  Plan:  - TIFFANY order placed, plan for 01/14/2021; patient NPO at midnight  - Pending: Thrombosis panel  - S/p  mg x 1  - Continue on ASA 81 mg daily starting 01/13/2021  - Continue atorvastatin 40 mg daily  - Normotension, euglycemia, normothermia  - Telemetry  - Frequent neuro checks  - PT/OT/speech  - Stroke education  - Medical management and supportive care per primary team, notify with changes  - [employer] form completed and faxed on 01/13/2021  - Patient will need to be evaluated by Ophthalmology in the outpatient setting to be cleared to drive  - Will need Outpatient OT Fitness to Drive evaluation on discharge, order placed     Imaging/Labs:  - CTA head and neck 01/12/2021:  · Questionable early ischemia/infarct in the right PCA territory involving the right medial occipital lobe  · 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  · Mild cervical carotid atherosclerotic disease  - MRI brain:  · Acute right occipital cortical infarcts with general swelling    No mass effect or hemorrhagic transformation  · Redemonstrated occluded right PCA at proximal P2 segment  · Few scattered foci of T2/FLAIR hyperintensity involving left frontal periventricular and subcortical white matter may represent sequela of chronic migraine disease vs mild microangiopathy  - Echo:  · EF 60%, no regional wall motion abnormalities  · Bilateral atrium size WNL    - Lipid panel: Cholesterol elevated 268, triglycerides elevated 179, HDL 40, LDL elevated 192  - Hemoglobin A1c:  5 2

## 2021-01-13 ENCOUNTER — APPOINTMENT (OUTPATIENT)
Dept: NON INVASIVE DIAGNOSTICS | Facility: HOSPITAL | Age: 61
DRG: 066 | End: 2021-01-13
Payer: COMMERCIAL

## 2021-01-13 PROBLEM — E78.5 HYPERLIPIDEMIA: Status: ACTIVE | Noted: 2021-01-13

## 2021-01-13 PROBLEM — I63.431 CEREBROVASCULAR ACCIDENT (CVA) DUE TO EMBOLISM OF RIGHT POSTERIOR CEREBRAL ARTERY (HCC): Status: ACTIVE | Noted: 2021-01-12

## 2021-01-13 LAB
ANION GAP SERPL CALCULATED.3IONS-SCNC: 11 MMOL/L (ref 4–13)
BUN SERPL-MCNC: 14 MG/DL (ref 5–25)
CALCIUM SERPL-MCNC: 9.2 MG/DL (ref 8.3–10.1)
CHLORIDE SERPL-SCNC: 106 MMOL/L (ref 100–108)
CHOLEST SERPL-MCNC: 268 MG/DL (ref 50–200)
CO2 SERPL-SCNC: 23 MMOL/L (ref 21–32)
CREAT SERPL-MCNC: 0.75 MG/DL (ref 0.6–1.3)
ERYTHROCYTE [DISTWIDTH] IN BLOOD BY AUTOMATED COUNT: 12.4 % (ref 11.6–15.1)
EST. AVERAGE GLUCOSE BLD GHB EST-MCNC: 103 MG/DL
GFR SERPL CREATININE-BSD FRML MDRD: 100 ML/MIN/1.73SQ M
GLUCOSE P FAST SERPL-MCNC: 101 MG/DL (ref 65–99)
GLUCOSE SERPL-MCNC: 101 MG/DL (ref 65–140)
HBA1C MFR BLD: 5.2 %
HCT VFR BLD AUTO: 44.2 % (ref 36.5–49.3)
HDLC SERPL-MCNC: 40 MG/DL
HGB BLD-MCNC: 15.6 G/DL (ref 12–17)
LDLC SERPL CALC-MCNC: 192 MG/DL (ref 0–100)
MCH RBC QN AUTO: 31.8 PG (ref 26.8–34.3)
MCHC RBC AUTO-ENTMCNC: 35.3 G/DL (ref 31.4–37.4)
MCV RBC AUTO: 90 FL (ref 82–98)
PLATELET # BLD AUTO: 186 THOUSANDS/UL (ref 149–390)
PLATELET # BLD AUTO: 213 THOUSANDS/UL (ref 149–390)
PMV BLD AUTO: 10.1 FL (ref 8.9–12.7)
PMV BLD AUTO: 10.3 FL (ref 8.9–12.7)
POTASSIUM SERPL-SCNC: 3.9 MMOL/L (ref 3.5–5.3)
RBC # BLD AUTO: 4.91 MILLION/UL (ref 3.88–5.62)
SODIUM SERPL-SCNC: 140 MMOL/L (ref 136–145)
TRIGL SERPL-MCNC: 179 MG/DL
WBC # BLD AUTO: 5.65 THOUSAND/UL (ref 4.31–10.16)

## 2021-01-13 PROCEDURE — 85305 CLOT INHIBIT PROT S TOTAL: CPT | Performed by: PHYSICIAN ASSISTANT

## 2021-01-13 PROCEDURE — 85303 CLOT INHIBIT PROT C ACTIVITY: CPT | Performed by: PHYSICIAN ASSISTANT

## 2021-01-13 PROCEDURE — 80048 BASIC METABOLIC PNL TOTAL CA: CPT | Performed by: PHYSICIAN ASSISTANT

## 2021-01-13 PROCEDURE — 85306 CLOT INHIBIT PROT S FREE: CPT | Performed by: PHYSICIAN ASSISTANT

## 2021-01-13 PROCEDURE — 85732 THROMBOPLASTIN TIME PARTIAL: CPT | Performed by: PHYSICIAN ASSISTANT

## 2021-01-13 PROCEDURE — 99233 SBSQ HOSP IP/OBS HIGH 50: CPT | Performed by: INTERNAL MEDICINE

## 2021-01-13 PROCEDURE — 99233 SBSQ HOSP IP/OBS HIGH 50: CPT | Performed by: PSYCHIATRY & NEUROLOGY

## 2021-01-13 PROCEDURE — 85300 ANTITHROMBIN III ACTIVITY: CPT | Performed by: PHYSICIAN ASSISTANT

## 2021-01-13 PROCEDURE — 97162 PT EVAL MOD COMPLEX 30 MIN: CPT

## 2021-01-13 PROCEDURE — 86146 BETA-2 GLYCOPROTEIN ANTIBODY: CPT | Performed by: PHYSICIAN ASSISTANT

## 2021-01-13 PROCEDURE — 83036 HEMOGLOBIN GLYCOSYLATED A1C: CPT | Performed by: PHYSICIAN ASSISTANT

## 2021-01-13 PROCEDURE — 99223 1ST HOSP IP/OBS HIGH 75: CPT | Performed by: INTERNAL MEDICINE

## 2021-01-13 PROCEDURE — 85705 THROMBOPLASTIN INHIBITION: CPT | Performed by: PHYSICIAN ASSISTANT

## 2021-01-13 PROCEDURE — 97167 OT EVAL HIGH COMPLEX 60 MIN: CPT

## 2021-01-13 PROCEDURE — 85613 RUSSELL VIPER VENOM DILUTED: CPT | Performed by: PHYSICIAN ASSISTANT

## 2021-01-13 PROCEDURE — 93306 TTE W/DOPPLER COMPLETE: CPT

## 2021-01-13 PROCEDURE — 81240 F2 GENE: CPT | Performed by: PHYSICIAN ASSISTANT

## 2021-01-13 PROCEDURE — 85049 AUTOMATED PLATELET COUNT: CPT | Performed by: PHYSICIAN ASSISTANT

## 2021-01-13 PROCEDURE — 80061 LIPID PANEL: CPT | Performed by: PHYSICIAN ASSISTANT

## 2021-01-13 PROCEDURE — 85670 THROMBIN TIME PLASMA: CPT | Performed by: PHYSICIAN ASSISTANT

## 2021-01-13 PROCEDURE — 86147 CARDIOLIPIN ANTIBODY EA IG: CPT | Performed by: PHYSICIAN ASSISTANT

## 2021-01-13 PROCEDURE — 85027 COMPLETE CBC AUTOMATED: CPT | Performed by: PHYSICIAN ASSISTANT

## 2021-01-13 PROCEDURE — 93306 TTE W/DOPPLER COMPLETE: CPT | Performed by: INTERNAL MEDICINE

## 2021-01-13 PROCEDURE — 81241 F5 GENE: CPT | Performed by: PHYSICIAN ASSISTANT

## 2021-01-13 RX ORDER — METOPROLOL TARTRATE 5 MG/5ML
2.5 INJECTION INTRAVENOUS ONCE
Status: COMPLETED | OUTPATIENT
Start: 2021-01-13 | End: 2021-01-13

## 2021-01-13 RX ADMIN — ENOXAPARIN SODIUM 40 MG: 40 INJECTION SUBCUTANEOUS at 08:29

## 2021-01-13 RX ADMIN — ATORVASTATIN CALCIUM 40 MG: 40 TABLET, FILM COATED ORAL at 18:05

## 2021-01-13 RX ADMIN — METOPROLOL TARTRATE 2.5 MG: 5 INJECTION INTRAVENOUS at 13:27

## 2021-01-13 RX ADMIN — ASPIRIN 81 MG CHEWABLE TABLET 81 MG: 81 TABLET CHEWABLE at 08:29

## 2021-01-13 NOTE — CASE MANAGEMENT
Admitted under observation, observation notice previously reviewed and provided  Changed to IP today  Not a bundle or readmission  Patient is a low risk for readmission with risk for readmission score of 7  Patient is alert and oriented x 4  CM met with patient to introduce self, explain role, complete CM assessment, and discuss DC planning  Patient resides with spouse in 3 floor (including basement) town home with 3 CAROLYN  Patient functioned independent PTA with no use of DME  No Hx HHC or STR  Patient has LW and POA, reported POA is spouse, CM requested copy of documents  No Hx MH or substance use  Patient works full time for [employer]  Patient drives and reports spouse to transport at DC  PCP is Dr Edmar Bhakta, patient has prescription coverage, and prefers using [facility] for medications  Patient reports no concerns from CM standpoint at this time  CM encouraged patient to reach out with any questions or concerns  No needs are identified at this time of the initial assessment, care manager will respond upon request or reassess patient for discharge needs as appropriate

## 2021-01-13 NOTE — UTILIZATION REVIEW
Notification of Observation Admission/Observation Authorization Request   This is a Notification of Observation Admission for [facility]  Be advised that this patient was admitted to our facility under Observation Status  Contact Elidia Lassiter at [phone] for additional admission information  [facility] DEPT  DEDICATED [phone]  Patient Name:   Hussain Post   YOB: 1960       [address]   [address]:   [place] [address]  Tax ID: [id number]  NPI: [id number] Attending Provider/NPI: Gifty Eckert Md [[id number]]   Place of Service Code: 25     Place of Service Name:  CPT Code for Observation:  On [address] [facility] / CPT 89918   Start Date:      Discharge Date & Time: No discharge date for patient encounter  Type of Admission: Observation Status Discharge Disposition   (if discharged): Final discharge disposition not confirmed   Patient Diagnoses: Visual complaint [H57 9]  Stroke-like symptoms [R29 90]  Symptoms of cerebrovascular accident (CVA) [R29 90]     Orders: Admission Orders (From admission, onward)     Ordered        01/12/21 1338  Place in Observation  Once                    Assigned Utilization Review Contact: Elidia Lassiter  Utilization   Network Utilization Review Department  Phone: [phone]; Fax [phone]  Email: [email or web address]   ATTENTION PAYERS: Please call the assigned Utilization  directly with any questions or concerns ALL voicemails in the department are confidential  Send all requests for admission clinical reviews, approved or denied determinations and any other requests to dedicated fax number belonging to the campus where the patient is receiving treatment

## 2021-01-13 NOTE — PLAN OF CARE
Problem: PAIN - ADULT  Goal: Verbalizes/displays adequate comfort level or baseline comfort level  Description: Interventions:  - Encourage patient to monitor pain and request assistance  - Assess pain using appropriate pain scale  - Administer analgesics based on type and severity of pain and evaluate response  - Implement non-pharmacological measures as appropriate and evaluate response  - Consider cultural and social influences on pain and pain management  - Notify physician/advanced practitioner if interventions unsuccessful or patient reports new pain  Outcome: Progressing     Problem: INFECTION - ADULT  Goal: Absence or prevention of progression during hospitalization  Description: INTERVENTIONS:  - Assess and monitor for signs and symptoms of infection  - Monitor lab/diagnostic results  - Monitor all insertion sites, i e  indwelling lines, tubes, and drains  - Monitor endotracheal if appropriate and nasal secretions for changes in amount and color  - [place] appropriate cooling/warming therapies per order  - Administer medications as ordered  - Instruct and encourage patient and family to use good hand hygiene technique  - Identify and instruct in appropriate isolation precautions for identified infection/condition  Outcome: Progressing  Goal: Absence of fever/infection during neutropenic period  Description: INTERVENTIONS:  - Monitor WBC    Outcome: Progressing     Problem: SAFETY ADULT  Goal: Patient will remain free of falls  Description: INTERVENTIONS:  - Assess patient frequently for physical needs  -  Identify cognitive and physical deficits and behaviors that affect risk of falls    -  [place] fall precautions as indicated by assessment   - Educate patient/family on patient safety including physical limitations  - Instruct patient to call for assistance with activity based on assessment  - Modify environment to reduce risk of injury  - Consider OT/PT consult to assist with strengthening/mobility  Outcome: Progressing  Goal: Maintain or return to baseline ADL function  Description: INTERVENTIONS:  -  Assess patient's ability to carry out ADLs; assess patient's baseline for ADL function and identify physical deficits which impact ability to perform ADLs (bathing, care of mouth/teeth, toileting, grooming, dressing, etc )  - Assess/evaluate cause of self-care deficits   - Assess range of motion  - Assess patient's mobility; develop plan if impaired  - Assess patient's need for assistive devices and provide as appropriate  - Encourage maximum independence but intervene and supervise when necessary  - Involve family in performance of ADLs  - Assess for home care needs following discharge   - Consider OT consult to assist with ADL evaluation and planning for discharge  - Provide patient education as appropriate  Outcome: Progressing  Goal: Maintain or return mobility status to optimal level  Description: INTERVENTIONS:  - Assess patient's baseline mobility status (ambulation, transfers, stairs, etc )    - Identify cognitive and physical deficits and behaviors that affect mobility  - Identify mobility aids required to assist with transfers and/or ambulation (gait belt, sit-to-stand, lift, walker, cane, etc )  - [place] fall precautions as indicated by assessment  - Record patient progress and toleration of activity level on Mobility SBAR; progress patient to next Phase/Stage  - Instruct patient to call for assistance with activity based on assessment  - Consider rehabilitation consult to assist with strengthening/weightbearing, etc   Outcome: Progressing     Problem: DISCHARGE PLANNING  Goal: Discharge to home or other facility with appropriate resources  Description: INTERVENTIONS:  - Identify barriers to discharge w/patient and caregiver  - Arrange for needed discharge resources and transportation as appropriate  - Identify discharge learning needs (meds, wound care, etc )  - Arrange for interpretive services to assist at discharge as needed  - Refer to Case Management Department for coordinating discharge planning if the patient needs post-hospital services based on physician/advanced practitioner order or complex needs related to functional status, cognitive ability, or social support system  Outcome: Progressing     Problem: Knowledge Deficit  Goal: Patient/family/caregiver demonstrates understanding of disease process, treatment plan, medications, and discharge instructions  Description: Complete learning assessment and assess knowledge base  Interventions:  - Provide teaching at level of understanding  - Provide teaching via preferred learning methods  Outcome: Progressing     Problem: Potential for Falls  Goal: Patient will remain free of falls  Description: INTERVENTIONS:  - Assess patient frequently for physical needs  -  Identify cognitive and physical deficits and behaviors that affect risk of falls    -  [place] fall precautions as indicated by assessment   - Educate patient/family on patient safety including physical limitations  - Instruct patient to call for assistance with activity based on assessment  - Modify environment to reduce risk of injury  - Consider OT/PT consult to assist with strengthening/mobility  Outcome: Progressing

## 2021-01-13 NOTE — PLAN OF CARE
Problem: PAIN - ADULT  Goal: Verbalizes/displays adequate comfort level or baseline comfort level  Description: Interventions:  - Encourage patient to monitor pain and request assistance  - Assess pain using appropriate pain scale  - Administer analgesics based on type and severity of pain and evaluate response  - Implement non-pharmacological measures as appropriate and evaluate response  - Consider cultural and social influences on pain and pain management  - Notify physician/advanced practitioner if interventions unsuccessful or patient reports new pain  Outcome: Progressing     Problem: INFECTION - ADULT  Goal: Absence or prevention of progression during hospitalization  Description: INTERVENTIONS:  - Assess and monitor for signs and symptoms of infection  - Monitor lab/diagnostic results  - Monitor all insertion sites, i e  indwelling lines, tubes, and drains  - Monitor endotracheal if appropriate and nasal secretions for changes in amount and color  - [place] appropriate cooling/warming therapies per order  - Administer medications as ordered  - Instruct and encourage patient and family to use good hand hygiene technique  - Identify and instruct in appropriate isolation precautions for identified infection/condition  Outcome: Progressing  Goal: Absence of fever/infection during neutropenic period  Description: INTERVENTIONS:  - Monitor WBC    Outcome: Progressing     Problem: SAFETY ADULT  Goal: Patient will remain free of falls  Description: INTERVENTIONS:  - Assess patient frequently for physical needs  -  Identify cognitive and physical deficits and behaviors that affect risk of falls    -  [place] fall precautions as indicated by assessment   - Educate patient/family on patient safety including physical limitations  - Instruct patient to call for assistance with activity based on assessment  - Modify environment to reduce risk of injury  - Consider OT/PT consult to assist with strengthening/mobility  Outcome: Progressing  Goal: Maintain or return to baseline ADL function  Description: INTERVENTIONS:  -  Assess patient's ability to carry out ADLs; assess patient's baseline for ADL function and identify physical deficits which impact ability to perform ADLs (bathing, care of mouth/teeth, toileting, grooming, dressing, etc )  - Assess/evaluate cause of self-care deficits   - Assess range of motion  - Assess patient's mobility; develop plan if impaired  - Assess patient's need for assistive devices and provide as appropriate  - Encourage maximum independence but intervene and supervise when necessary  - Involve family in performance of ADLs  - Assess for home care needs following discharge   - Consider OT consult to assist with ADL evaluation and planning for discharge  - Provide patient education as appropriate  Outcome: Progressing  Goal: Maintain or return mobility status to optimal level  Description: INTERVENTIONS:  - Assess patient's baseline mobility status (ambulation, transfers, stairs, etc )    - Identify cognitive and physical deficits and behaviors that affect mobility  - Identify mobility aids required to assist with transfers and/or ambulation (gait belt, sit-to-stand, lift, walker, cane, etc )  - [place] fall precautions as indicated by assessment  - Record patient progress and toleration of activity level on Mobility SBAR; progress patient to next Phase/Stage  - Instruct patient to call for assistance with activity based on assessment  - Consider rehabilitation consult to assist with strengthening/weightbearing, etc   Outcome: Progressing     Problem: DISCHARGE PLANNING  Goal: Discharge to home or other facility with appropriate resources  Description: INTERVENTIONS:  - Identify barriers to discharge w/patient and caregiver  - Arrange for needed discharge resources and transportation as appropriate  - Identify discharge learning needs (meds, wound care, etc )  - Arrange for interpretive services to assist at discharge as needed  - Refer to Case Management Department for coordinating discharge planning if the patient needs post-hospital services based on physician/advanced practitioner order or complex needs related to functional status, cognitive ability, or social support system  Outcome: Progressing     Problem: Knowledge Deficit  Goal: Patient/family/caregiver demonstrates understanding of disease process, treatment plan, medications, and discharge instructions  Description: Complete learning assessment and assess knowledge base  Interventions:  - Provide teaching at level of understanding  - Provide teaching via preferred learning methods  Outcome: Progressing     Problem: Potential for Falls  Goal: Patient will remain free of falls  Description: INTERVENTIONS:  - Assess patient frequently for physical needs  -  Identify cognitive and physical deficits and behaviors that affect risk of falls    -  [place] fall precautions as indicated by assessment   - Educate patient/family on patient safety including physical limitations  - Instruct patient to call for assistance with activity based on assessment  - Modify environment to reduce risk of injury  - Consider OT/PT consult to assist with strengthening/mobility  Outcome: Progressing

## 2021-01-13 NOTE — PHYSICAL THERAPY NOTE
PHYSICAL THERAPY EVALUATION  NAME:  Wendy Dye  DATE: 01/13/21    AGE:   62 y o   Mrn:   [id number]  ADMIT DX:  Visual complaint [H57 9]  Stroke-like symptoms [R29 90]  Symptoms of cerebrovascular accident (CVA) [R29 90]    History reviewed  No pertinent past medical history  History reviewed  No pertinent surgical history      Length Of Stay: 0  Performed at least 2 patient identifiers during session: Name and Birthday  PHYSICAL THERAPY EVALUATION :      01/13/21 1220   PT Last Visit   PT Visit Date 01/13/21   Note Type   Note type Evaluation   Pain Assessment   Pain Assessment Tool Pain Assessment not indicated - pt denies pain   Home Living   Type of [address] Two level  ([place])   Home Equipment   (none prior to admit)   Prior Function   Level of [place] Independent with ADLs and functional mobility   Lives With Spouse   ADL Assistance Independent   IADLs Independent   Falls in the last 6 months 0   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, 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[employer])   Restrictions/Precautions   Weight Bearing Precautions Per Order No   Other Precautions Telemetry   General   Family/Caregiver Present No   Cognition   Overall Cognitive Status WFL   Arousal/Participation Alert   Orientation Level Oriented X4   Memory Within functional limits   Following Commands Follows one step commands without difficulty   RUE Strength   RUE Overall Strength Within Functional Limits - strength 5/5   LUE Strength   LUE Overall Strength Within Functional Limits - strength 5/5   Strength RLE   R Hip Flexion 5/5   R Knee Extension 5/5   R Ankle Dorsiflexion 5/5   Strength LLE   L Hip Flexion 5/5   L Knee Extension 5/5   L Ankle Dorsiflexion 5/5   Coordination   Movements are Fluid and Coordinated 1   Sensation   (some limited vision L side, reports improved from yesterday)   Light Touch   RLE Light Touch Grossly intact  (per pt )   LLE Light Touch Grossly intact  (per pt)   Bed Mobility   Supine to Sit   (Pt reports indep, Pt in chair upon entering room)   Transfers   Sit to Stand 7  Independent   Stand to Sit 7  Independent   Ambulation/Elevation   Gait pattern WNL   Gait Assistance 7  Independent   Assistive Device   (none)   Distance 400'   Stair Management Assistance 7  Independent   Stair Management Technique No rails; Alternating pattern; Foreward   Number of Stairs 4   Balance   Static Sitting Normal   Static Standing Normal   Ambulatory Normal; TUG 6 seconds, 7 5 5xSTS  4 point DGI below; comfortable gait speed in halls 1 05m/s   Endurance Deficit   Endurance Deficit No  (HR 90's post ambulation, no self reported fatigue)   Activity Tolerance   Activity Tolerance Patient tolerated treatment well   Nurse Made Aware spoke to RN   Assessment   Prognosis Excellent   Problem List Impaired vision;Obesity   Barriers to Discharge   (steps at home)   Goals   Patient Goals to go home, back to work, get vision back   PT Treatment Day 0   Plan   Treatment/Interventions Spoke to nursing  (DC from IPPT services)   Recommendation   PT Discharge Recommendation Return to previous environment with social support  (possible OPPT +/- fitness to drive, no PT indicated upon DC)   Equipment Recommended   (none)   Barthel Index   Transfers (Bed/Chair) Score 15   Mobility (Level Surface) Score 15   Stairs Score 10   (Please find full objective findings from PT assessment regarding body systems outlined above)  4 Item Dynamic Gait Index  3/3 Gait level surface  3/3 Change in gait speed  3/3 Gait with horizontal head turns  3/3 Gait with vertical head turns  12/12 total score (<10/12 indicates increased risk of fall)      Assessment: Pt is a 62 y o  male seen for PT evaluation s/p admit to [facility] on 1/12/2021 w/ Cerebrovascular accident (CVA) due to embolism of right posterior cerebral artery ([address] )  Order placed for PT    Upon evaluation: Pt requires indep assistance for bed mobility, transfers, and ambulation with out device in halls and indep w/ stair training  Pt's clinical presentation is currently evolving given fall risks including limited vision, and combined with medical complications of hypertension   However pt scores better than fall risk for TUG balance tests and 4 point DGI tests, adequate for 5xSTS, and better than fall risk for gait speed  Pt to benefit from restorative amb while in hospital and upon DC to address deficits as defined above and maximize level of functional mobility  Recommend OT consult for further vision, driving, work/music playing related concerns upon DC  No further IPPT indicated at this time  CUT OFF SCORES TUG:   Cut-Off Scores indicating risk of falls by population   Population Cut-Off score Author   Older stoke patients > 14* Dorita et al, 2006   * Time in seconds   [employer] se  [email or web address]  [email or web address]? [email or web address]  Accessed 11/29/2016  Gait Speed Interpretation:     Community ambulator: 0 8-1 2 m/sec   Able to safely cross streets: >1 2 m/sec    Adapted from [employer] accessed 9/1/2020    Comorbidities affecting pt's physical performance at time of assessment include: HTN, obesity  Personal factors affecting pt at time of IE include: steps to enter environment, multi-level environment, behavioral pattern and inability to perform IADLs       Hugh Tolentino, PT, DPT

## 2021-01-13 NOTE — PROGRESS NOTES
[address] - Internal Medicine Service  Progress Note - Cherie Kirby 1960, 62 y o  male MRN: [id number]  Unit/Bed#: S -01 Encounter: [id number]  Primary Care Provider: Mary Roa DO   Date and time admitted to hospital: 1/12/2021  9:11 AM    * Cerebrovascular accident (CVA) due to embolism of right posterior cerebral artery ([address] )  Assessment & Plan  · Initially presented with headache, left eye visual abnormality and left arm weakness with some left lip tingling  Majority of his symptoms have resolved besides some left eye blurriness  · CTA: "Questionable early ischemia/infarction in the right PCA territory involving the right medial occipital lobe  Follow-up MR imaging is recommended  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  Mild cervical carotid atherosclerotic disease "  · MRI: "Acute right occipital cortical infarcts with gyral swelling  No mass effect or hemorrhagic transformation  Redemonstrated occluded right PCA at proximal P2 segment  A few scattered foci of T2/FLAIR hyperintensity involving left frontal periventricular and subcortical white matter may represent sequela of chronic migraine disease versus mild microangiopathy "  o Continue telemetry monitoring  Continue neuro checks  o TTE pending   - Will need TIFFANY tomorrow AM   o Continue aspirin and statin   Possibly cardio-embolic, will need outpatient cardiology evaluation  Hyperlipidemia  Assessment & Plan  · New diagnosis, cholesterol 268, triglycerides 179, HDL 40,   · Start high-intensity statin, Lipitor 40 mg daily  · Will need outpatient monitoring with PCP  Anxiety  Assessment & Plan  · Extremely anxious, which is situational as he has not been in the hospital and is anxious about being in the hospital at this time  · Ativan p r n      VTE Pharmacologic Prophylaxis:   Pharmacologic: Enoxaparin (Lovenox)  Mechanical VTE Prophylaxis in Place: Yes    Patient Centered Rounds: I have performed bedside rounds with nursing staff today  Discussions with Specialists or Other Care Team Provider: CM, neuro    Education and Discussions with Family / Patient: patient at bedside, called wife    Time Spent for Care: 30 minutes  More than 50% of total time spent on counseling and coordination of care as described above  Current Length of Stay: 0 day(s)    Current Patient Status: Observation   Certification Statement: The patient will continue to require additional inpatient hospital stay due to CVA with defcit    Discharge Plan: after echo completed, possibly today pending neuro clearance    Code Status: Level 1 - Full Code      Subjective:   Reports his vision is improving  No headaches, numbness, dizziness, chest pain, shortness of breath, lightheadedness, nausea, vomiting  Objective:     Vitals:   Temp (24hrs), Av 8 °F (36 6 °C), Min:97 4 °F (36 3 °C), Max:98 2 °F (36 8 °C)    Temp:  [97 4 °F (36 3 °C)-98 2 °F (36 8 °C)] 98 2 °F (36 8 °C)  HR:  [65-85] 77  Resp:  [16-20] 20  BP: (110-185)/(65-97) 136/82  SpO2:  [93 %-97 %] 95 %  There is no height or weight on file to calculate BMI  Input and Output Summary (last 24 hours): Intake/Output Summary (Last 24 hours) at 2021 0943  Last data filed at 2021 0802  Gross per 24 hour   Intake 0 ml   Output    Net 0 ml       Physical Exam:     Physical Exam  Vitals signs and nursing note reviewed  Constitutional:       General: He is not in acute distress  Appearance: Normal appearance  HENT:      Head: Normocephalic  Mouth/Throat:      Mouth: Mucous membranes are moist    Eyes:      Pupils: Pupils are equal, round, and reactive to light  Neck:      Musculoskeletal: Normal range of motion  Cardiovascular:      Rate and Rhythm: Normal rate and regular rhythm  Heart sounds: No murmur     Pulmonary:      Effort: Pulmonary effort is normal  No respiratory distress  Breath sounds: Normal breath sounds  No wheezing, rhonchi or rales  Abdominal:      General: Bowel sounds are normal  There is no distension  Palpations: Abdomen is soft  Tenderness: There is no abdominal tenderness  There is no guarding  Musculoskeletal:         General: No deformity  Right lower leg: No edema  Left lower leg: No edema  Skin:     Capillary Refill: Capillary refill takes less than 2 seconds  Neurological:      General: No focal deficit present  Mental Status: He is alert and oriented to person, place, and time  Mental status is at baseline  Additional Data:     Labs:    Results from last 7 days   Lab Units 01/13/21  0449 01/12/21  1009   WBC Thousand/uL 5 65 7 79   HEMOGLOBIN g/dL 15 6 16 4   HEMATOCRIT % 44 2 47 5   PLATELETS Thousands/uL 186 206   NEUTROS PCT %  --  81*   LYMPHS PCT %  --  13*   MONOS PCT %  --  6   EOS PCT %  --  0     Results from last 7 days   Lab Units 01/13/21  0449   SODIUM mmol/L 140   POTASSIUM mmol/L 3 9   CHLORIDE mmol/L 106   CO2 mmol/L 23   BUN mg/dL 14   CREATININE mg/dL 0 75   ANION GAP mmol/L 11   CALCIUM mg/dL 9 2   GLUCOSE RANDOM mg/dL 101                           * I Have Reviewed All Lab Data Listed Above  * Additional Pertinent Lab Tests Reviewed:  All Labs Within Last 24 Hours Reviewed    Imaging:    Imaging Reports Reviewed Today Include: MRI brain  Imaging Personally Reviewed by Myself Includes:  none    Recent Cultures (last 7 days):           Last 24 Hours Medication List:   Current Facility-Administered Medications   Medication Dose Route Frequency Provider Last Rate    acetaminophen  650 mg Oral Q6H PRN Janis Arana, PA-C      aspirin  81 mg Oral Daily Janis Pardoe PA-C      atorvastatin  40 mg Oral QPM Janis Odfish PA-C      enoxaparin  40 mg Subcutaneous Daily Janis Odfish, PA-C      LORazepam  0 5 mg Oral Q8H PRN Janis Arana PA-C      ondansetron  4 mg Intravenous Q6H PRN Júnior Hamm PA-C          Today, Patient Was Seen By: Gene Cuello PA-C    ** Please Note: Dictation voice to text software may have been used in the creation of this document   **

## 2021-01-13 NOTE — PROGRESS NOTES
Progress Note - Neurology   Duy Roa 62 y o  male MRN: [id number]  Unit/Bed#: S -01 Encounter: [id number]      Assessment/Plan   * Cerebrovascular accident (CVA) due to embolism of right posterior cerebral artery [facility])  Assessment & Plan  Duy Roa is a 62 y o  male with anxiety who presents to [place] [facility] ED on 01/12/2021 with blurry vision of left eye  Acute right occipital cortical infarcts noted on MRI brain with evidence of patchy left homonymous hemianopsia on exam   CTA head and neck without critical stenosis, less likely atheroembolic  Etiology likely cardioembolic, will likely obtain TIFFANY to rule out central thrombus/PFO  Patient will need loop monitor placement within 1 week of discharge  Plan:  - TIFFANY order placed, plan for 01/14/2021; patient NPO at midnight  - Pending: Thrombosis panel  - S/p  mg x 1  - Continue on ASA 81 mg daily starting 01/13/2021  - Continue atorvastatin 40 mg daily  - Normotension, euglycemia, normothermia  - Telemetry  - Frequent neuro checks  - PT/OT/speech  - Stroke education  - Medical management and supportive care per primary team, notify with changes  - [employer] form completed and faxed on 01/13/2021  - Patient will need to be evaluated by Ophthalmology in the outpatient setting to be cleared to drive  - Will need Outpatient OT Fitness to Drive evaluation on discharge, order placed     Imaging/Labs:  - CTA head and neck 01/12/2021:  · Questionable early ischemia/infarct in the right PCA territory involving the right medial occipital lobe  · 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  · Mild cervical carotid atherosclerotic disease  - MRI brain:  · Acute right occipital cortical infarcts with general swelling    No mass effect or hemorrhagic transformation  · Redemonstrated occluded right PCA at proximal P2 segment  · Few scattered foci of T2/FLAIR hyperintensity involving left frontal periventricular and subcortical white matter may represent sequela of chronic migraine disease vs mild microangiopathy  - Echo:  · EF 60%, no regional wall motion abnormalities  · Bilateral atrium size WNL    - Lipid panel:  Cholesterol elevated 268, triglycerides elevated 179, HDL 40, LDL elevated 192  - Hemoglobin A1c:  5 2    Hyperlipidemia  Assessment & Plan  - LDL elevated 192  - Continue atorvastatin 40 mg daily    Anxiety  Assessment & Plan  - S/p IV Ativan 0 5 mg x 1  - Medical management per primary team    Trevor Leblanc will need follow up in in 4 weeks with neurovascular attending or advance practitioner  He will not require outpatient neurological testing  Subjective: Today patient reports that his vision is improving  Patient states he no longer has any blurry vision and feels that his visual loss on the left is improved  Denies any new focal numbness or weakness  Denies new visual loss/deficits, worsening visual deficits, headache, ambulatory dysfunction, speech disturbances  ROS:  12 point ROS performed, as stated above, all others negative  Vitals: Blood pressure 160/82, pulse 76, temperature 99 1 °F (37 3 °C), temperature source Oral, resp  rate 18, height 6' (1 829 m), SpO2 94 %  ,There is no height or weight on file to calculate BMI  Physical Exam  Vitals signs and nursing note reviewed  Constitutional:       General: He is not in acute distress  Appearance: Normal appearance  He is normal weight  He is not ill-appearing, toxic-appearing or diaphoretic  HENT:      Head: Normocephalic and atraumatic  Eyes:      General: No scleral icterus  Right eye: No discharge  Left eye: No discharge  Extraocular Movements: Extraocular movements intact and EOM normal       Conjunctiva/sclera: Conjunctivae normal    Neck:      Musculoskeletal: Normal range of motion and neck supple  Pulmonary:      Effort: No respiratory distress  Musculoskeletal: Normal range of motion  Skin:     General: Skin is warm and dry  Coloration: Skin is not jaundiced or pale  Findings: No bruising, erythema, lesion or rash  Neurological:      Mental Status: He is alert  Psychiatric:         Mood and Affect: Mood normal          Behavior: Behavior normal          Thought Content: Thought content normal          Judgment: Judgment normal        Neurologic Exam     Mental Status   Patient is alert, sitting up in chair  Oriented x3  No dysarthria or aphasia noted  Able to name objects provided, follows multistep commands, answers all questions appropriately  Cranial Nerves     CN III, IV, VI   Extraocular motions are normal    Nystagmus: none   Conjugate gaze: present    CN V   Facial sensation intact  CN VII   Facial expression full, symmetric  CN VIII   Hearing: intact    CN XI   CN XI normal      Patchy loss of vision in left visual field, does not appear to have a complete left homonymous hemianopsia     Motor Exam   Muscle bulk: normal  Overall muscle tone: normal  Right arm pronator drift: absent  Left arm pronator drift: absent   strength symmetric, 5/5 bilaterally  Bilateral upper and lower extremity strength testing 5/5 throughout     Sensory Exam   Light touch normal      Gait, Coordination, and Reflexes     Tremor   Resting tremor: absent  No ataxia or dysmetria on bilateral finger-to-nose testing  No involuntary movements or seizure-like activity noted throughout exam     Lab Results: I have personally reviewed pertinent reports    Recent Results (from the past 24 hour(s))   Lipid Panel with Direct LDL reflex    Collection Time: 01/13/21  4:49 AM   Result Value Ref Range    Cholesterol 268 (H) 50 - 200 mg/dL    Triglycerides 179 (H) <=150 mg/dL    HDL, Direct 40 >=40 mg/dL    LDL Calculated 192 (H) 0 - 100 mg/dL   Hemoglobin A1c w/EAG Estimation    Collection Time: 01/13/21  4:49 AM   Result Value Ref Range    Hemoglobin A1C 5 2 Normal 3 8-5 6%; PreDiabetic 5 7-6 4%; Diabetic >=6 5%; Glycemic control for adults with diabetes <7 0% %     mg/dl   Basic metabolic panel    Collection Time: 01/13/21  4:49 AM   Result Value Ref Range    Sodium 140 136 - 145 mmol/L    Potassium 3 9 3 5 - 5 3 mmol/L    Chloride 106 100 - 108 mmol/L    CO2 23 21 - 32 mmol/L    ANION GAP 11 4 - 13 mmol/L    BUN 14 5 - 25 mg/dL    Creatinine 0 75 0 60 - 1 30 mg/dL    Glucose 101 65 - 140 mg/dL    Glucose, Fasting 101 (H) 65 - 99 mg/dL    Calcium 9 2 8 3 - 10 1 mg/dL    eGFR 100 ml/min/1 73sq m   CBC (With Platelets)    Collection Time: 01/13/21  4:49 AM   Result Value Ref Range    WBC 5 65 4 31 - 10 16 Thousand/uL    RBC 4 91 3 88 - 5 62 Million/uL    Hemoglobin 15 6 12 0 - 17 0 g/dL    Hematocrit 44 2 36 5 - 49 3 %    MCV 90 82 - 98 fL    MCH 31 8 26 8 - 34 3 pg    MCHC 35 3 31 4 - 37 4 g/dL    RDW 12 4 11 6 - 15 1 %    Platelets 305 417 - 562 Thousands/uL    MPV 10 3 8 9 - 12 7 fL   ]  Imaging Studies: I have personally reviewed pertinent reports and I have personally reviewed pertinent films in PACS  EKG, Pathology, and Other Studies: I have personally reviewed pertinent reports  VTE Prophylaxis: Enoxaparin (Lovenox)    Counseling / Coordination of Care  Total time spent today 38 minutes  Greater than 50% of total time was spent with the patient and/or family counseling and/or coordination of care  A description of the counseling/coordination of care:  Patient was seen and evaluated  Discussed with attending  Chart reviewed thoroughly including laboratory and imaging studies    Plan of care discussed with patient and primary team   Discussed results from MRI brain and plan for likely TIFFANY the morning of 01/14/2021

## 2021-01-13 NOTE — ASSESSMENT & PLAN NOTE
· Initially presented with headache, left eye visual abnormality and left arm weakness with some left lip tingling  Majority of his symptoms have resolved besides some left eye blurriness  · CTA: "Questionable early ischemia/infarction in the right PCA territory involving the right medial occipital lobe  Follow-up MR imaging is recommended  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  Mild cervical carotid atherosclerotic disease "  · MRI: "Acute right occipital cortical infarcts with gyral swelling  No mass effect or hemorrhagic transformation  Redemonstrated occluded right PCA at proximal P2 segment  A few scattered foci of T2/FLAIR hyperintensity involving left frontal periventricular and subcortical white matter may represent sequela of chronic migraine disease versus mild microangiopathy "  o Continue telemetry monitoring  Continue neuro checks  o Echo pending  o Continue aspirin and statin   Due to possible cardioembolic nature, the patient will require Cardiology evaluation of possible loop recorder placement

## 2021-01-13 NOTE — OCCUPATIONAL THERAPY NOTE
Occupational Therapy Evaluation     Patient Name: Heaven BISHOP Date: 1/13/2021  Problem List  Principal Problem:    Cerebrovascular accident (CVA) due to embolism of right posterior cerebral artery [facility])  Active Problems:    Anxiety    Hyperlipidemia    Past Medical History  History reviewed  No pertinent past medical history  Past Surgical History  History reviewed  No pertinent surgical history  01/13/21 1600   OT Last Visit   OT Visit Date 01/13/21   Note Type   Note type Evaluation   Restrictions/Precautions   Weight Bearing Precautions Per Order No   Other Precautions Telemetry   Pain Assessment   Pain Assessment Tool Pain Assessment not indicated - pt denies pain   Home Living   Type of [address] Two level   Additional Comments No AD PTA   Prior Function   Level of [place] Independent with ADLs and functional mobility   Lives With Spouse   ADL Assistance Independent   IADLs Independent   Falls in the last 6 months 0   Vocational Full time employment   Comments 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  Reports he is active, rides his bike and takes frequent breaks from the screen  Psychosocial   Psychosocial (WDL) WDL   Subjective   Subjective "I'm an anxious person, and this kind of stuff freaks me out, but I was able to tolerate an MRI, which is good for me" Pt pleasant and agreeable to OT  ADL   Where Assessed Chair   Eating Assistance 7  Independent   Grooming Assistance 7  Independent   UB Bathing Assistance 7  Independent   LB Bathing Assistance 7  Independent   UB Dressing Assistance 7  Freddy James   Additional Comments Recieved OOB to chair    Transfers   Sit to Stand 7  Independent   Stand to Sit 7  Independent   Stand pivot 7  Independent   Additional Comments Ambulates around room independently      Balance   Static Sitting Normal   Dynamic Sitting Normal   Static Standing Normal   Dynamic Standing Normal   Ambulatory Normal   Activity Tolerance   Activity Tolerance Patient tolerated treatment well   Medical Staff Made Aware Spoke to [facility] PT prior to eval    Nurse Made Aware Clearance from RN   RUE Assessment   RUE Assessment WFL   LUE Assessment   LUE Assessment WFL   Hand Function   Gross Motor Coordination Functional   Fine Motor Coordination Functional   Sensation   Light Touch No apparent deficits   Vision-Basic Assessment   Current Vision Wears glasses for distance only   Patient Visual Report Other (Comment)  (Some L peripheral loss, resolving )   Vision - Complex Assessment   Ocular Range of Motion WFL   Head Position WDL   Tracking Able to track stimulus in all quads without difficulty   Visual Fields   (Detects stimulus in all quadrants formally and functionally )   Acuity Able to read clock/calendar on wall without difficulty; Able to read employee name badge without difficulty   Visual Screen Results Left homonymous hemianopsia  (Which has almost fully resolved since initial onset)   Perception   Inattention/Neglect Appears intact   Motor Planning Appears intact   Cognition   Overall Cognitive Status [facility]   Arousal/Participation Alert; Cooperative   Attention Within functional limits   Orientation Level Oriented X4   Memory Within functional limits   Following Commands Follows one step commands without difficulty   Assessment   Limitation Visual deficit   Prognosis Good   Assessment Pt is a 62 y o  male seen for OT evaluation s/p admit [facility] on 1/12/2021 w/ Cerebrovascular accident (CVA) due to embolism of right posterior cerebral artery ([address] )  Comorbidities affecting pt's functional performance at time of assessment include: HLD, anxiety  Personal factors affecting pt at time of IE include:steps to enter environment and health management    Prior to admission, pt was independent in ADLs, IADLs and functional mobility with no AD, +, works full time  Upon evaluation: Pt requires no assistance with ADLs or functional mobility despite new onset of L visual impairment  From OT standpoint, recommendation at time of d/c would be follow up in outpatient OT for driving clearance and visual assessment  Goals   Patient Goals To go home and see his wife      Plan   OT Frequency Eval only   Recommendation   OT Discharge Recommendation Other (Comment)  (Outpatient OT for driving clearance  )   AM-PAC Daily Activity Inpatient   Lower Body Dressing 4   Bathing 4   Toileting 4   Upper Body Dressing 4   Grooming 4   Eating 4   Daily Activity Raw Score 24   Daily Activity Standardized Score (Calc for Raw Score >=11) 57 54      Elisabeth Romp, MOT, OTR/L

## 2021-01-13 NOTE — ASSESSMENT & PLAN NOTE
· New diagnosis, cholesterol 268, triglycerides 179, HDL 40,   · Start high-intensity statin, Lipitor 40 mg daily  · Will need outpatient monitoring with PCP

## 2021-01-13 NOTE — ASSESSMENT & PLAN NOTE
· Initially presented with headache, left eye visual abnormality and left arm weakness with some left lip tingling  Majority of his symptoms have resolved besides some left eye blurriness  · CTA: "Questionable early ischemia/infarction in the right PCA territory involving the right medial occipital lobe  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  Mild cervical carotid atherosclerotic disease "  · MRI: "Acute right occipital cortical infarcts with gyral swelling  No mass effect or hemorrhagic transformation  Redemonstrated occluded right PCA at proximal P2 segment  A few scattered foci of T2/FLAIR hyperintensity involving left frontal periventricular and subcortical white matter may represent sequela of chronic migraine disease versus mild microangiopathy "  o TTE unremarkable  o TIFFANY negative for clot  o Continue aspirin and statin     Possibly cardio-embolic, will need outpatient cardiology evaluation for loop   OT driving eval

## 2021-01-13 NOTE — CONSULTS
Consultation - Cardiology   Erma William 62 y o  male MRN: [id number]  Unit/Bed#: S -01 Encounter: [id number]    Assessment/Plan     Principal Problem:    Cerebrovascular accident (CVA) due to embolism of right posterior cerebral artery ([address] )  Active Problems:    Anxiety    Hyperlipidemia      Assessment/Plan    1  Small right occipital lobe stroke/rtPCA occlusion-per neurology suggestive of embolic source, likely cardioembolic  I received a call from Neurology PA was requesting TIFFANY  and loop recorder  Will follow-up transthoracic echocardiogram that has already been performed  If unremarkable will proceed with TIFFANY tomorrow as requested  Patient is agreeable  If necessary at that point will schedule outpatient loop recorder once scheduling allows  Telemetry revealed-sinus rhythm with no evidence of atrial fibrillation  He has been placed on aspirin 81/atorvastatin 40 mg   Patient has elevated blood pressure on presentation with no history of hypertension  Per Neurology would maintain normotension  BP this morning 110-136/65-82      History of Present Illness   Physician Requesting Consult: Mitali Ghosh MD  Reason for Consult / Principal Problem:  Cardioembolic stroke-requesting TIFFANY and loop recorder    HPI: Erma William is a 62y o  year old male who presents with visual symptoms and left arm tingling  Infarct right PCA territory involving the right medial occipital lobe  Abrupt termination of right proximal P2 segment of the 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  Brain MRI reveals acute right occipital cortical infarct  Cardiology was consulted for TIFFANY and loop recorder as they feel the PCA occlusion is suggestive of embolic source, most likely cardioembolic  Patient reports history of dyslipidemia, no hypertension  Family history of stroke  Father  of a stroke in his 76s    Mother also had a stroke with multiple other problems including carotid artery disease  Inpatient consult to Cardiology  Consult performed by: YFN Sinclair  Consult ordered by: Holly Chaudhry PA-C          Review of Systems   Constitutional: Negative  HENT: Negative  Eyes: Positive for visual disturbance  Respiratory: Negative  Cardiovascular: Negative  Gastrointestinal: Negative  Endocrine: Negative  Genitourinary: Negative  Musculoskeletal: Negative  Skin: Negative  Allergic/Immunologic: Negative  Neurological:        Lt arm tingling   Hematological: Negative  Psychiatric/Behavioral: Negative  All other systems reviewed and are negative  Historical Information   History reviewed  No pertinent past medical history  History reviewed  No pertinent surgical history  Social History     Substance and Sexual Activity   Alcohol Use Not Currently     Social History     Substance and Sexual Activity   Drug Use Never     Social History     Tobacco Use   Smoking Status Never Smoker   Smokeless Tobacco Never Used     Family History: History reviewed  No pertinent family history  Meds/Allergies   current meds:   Current Facility-Administered Medications   Medication Dose Route Frequency    acetaminophen (TYLENOL) tablet 650 mg  650 mg Oral Q6H PRN    aspirin chewable tablet 81 mg  81 mg Oral Daily    atorvastatin (LIPITOR) tablet 40 mg  40 mg Oral QPM    enoxaparin (LOVENOX) subcutaneous injection 40 mg  40 mg Subcutaneous Daily    LORazepam (ATIVAN) tablet 0 5 mg  0 5 mg Oral Q8H PRN    ondansetron (ZOFRAN) injection 4 mg  4 mg Intravenous Q6H PRN    and PTA meds:    No medications prior to admission  No Known Allergies    Objective   Vitals: Blood pressure 136/82, pulse 77, temperature 98 2 °F (36 8 °C), temperature source Oral, resp  rate 20, height 6' (1 829 m), SpO2 95 %    Orthostatic Blood Pressures      Most Recent Value   Blood Pressure  136/82 filed at 01/13/2021 0802   Patient Position - Orthostatic VS  Sitting filed at 01/13/2021 0802            Intake/Output Summary (Last 24 hours) at 1/13/2021 1028  Last data filed at 1/13/2021 0900  Gross per 24 hour   Intake 250 ml   Output    Net 250 ml       Invasive Devices     Peripheral Intravenous Line            Peripheral IV 01/12/21 Right Antecubital 1 day                Physical Exam: /82 (BP Location: Left arm)   Pulse 77   Temp 98 2 °F (36 8 °C) (Oral)   Resp 20   Ht 6' (1 829 m)   SpO2 95%   General Appearance:    Alert, cooperative, no distress, appears stated age   Head:    Normocephalic, no scleral icterus   Eyes:    PERRL   Nose:   Nares normal, septum midline, mucosa normal, no drainage    Throat:   Lips, mucosa, and tongue normal   Neck:   Supple, symmetrical, trachea midline     no JVD   Back:     Symmetric   Lungs:     Clear to auscultation bilaterally, respirations unlabored   Chest Wall:    No tenderness or deformity    Heart:    Regular rate and rhythm, S1 and S2 normal, no murmur, rub   or gallop   Abdomen:     Soft, non-tender, bowel sounds active all four quadrants,     no masses, no organomegaly   Extremities:   Extremities normal, atraumatic, no cyanosis or edema   Pulses:   2+ and symmetric all extremities   Skin:   Skin color, texture, turgor normal, no rashes or lesions   Neurologic:   Alert and oriented to person place and time         Lab Results:   Recent Results (from the past 72 hour(s))   ECG 12 lead    Collection Time: 01/12/21 10:01 AM   Result Value Ref Range    Ventricular Rate 70 BPM    Atrial Rate 73 BPM    WV Interval 136 ms    QRSD Interval 100 ms    QT Interval 382 ms    QTC Interval 413 ms    P Axis 60 degrees    QRS Axis -58 degrees    T Wave Axis 56 degrees   CBC and differential    Collection Time: 01/12/21 10:09 AM   Result Value Ref Range    WBC 7 79 4 31 - 10 16 Thousand/uL    RBC 5 25 3 88 - 5 62 Million/uL    Hemoglobin 16 4 12 0 - 17 0 g/dL    Hematocrit 47 5 36 5 - 49 3 %    MCV 91 82 - 98 fL    MCH 31 2 26 8 - 34 3 pg MCHC 34 5 31 4 - 37 4 g/dL    RDW 12 2 11 6 - 15 1 %    MPV 9 7 8 9 - 12 7 fL    Platelets 419 787 - 580 Thousands/uL    nRBC 0 /100 WBCs    Neutrophils Relative 81 (H) 43 - 75 %    Immat GRANS % 0 0 - 2 %    Lymphocytes Relative 13 (L) 14 - 44 %    Monocytes Relative 6 4 - 12 %    Eosinophils Relative 0 0 - 6 %    Basophils Relative 0 0 - 1 %    Neutrophils Absolute 6 31 1 85 - 7 62 Thousands/µL    Immature Grans Absolute 0 01 0 00 - 0 20 Thousand/uL    Lymphocytes Absolute 0 97 0 60 - 4 47 Thousands/µL    Monocytes Absolute 0 45 0 17 - 1 22 Thousand/µL    Eosinophils Absolute 0 02 0 00 - 0 61 Thousand/µL    Basophils Absolute 0 03 0 00 - 0 10 Thousands/µL   Basic metabolic panel    Collection Time: 01/12/21 10:09 AM   Result Value Ref Range    Sodium 137 136 - 145 mmol/L    Potassium 4 4 3 5 - 5 3 mmol/L    Chloride 104 100 - 108 mmol/L    CO2 27 21 - 32 mmol/L    ANION GAP 6 4 - 13 mmol/L    BUN 11 5 - 25 mg/dL    Creatinine 0 75 0 60 - 1 30 mg/dL    Glucose 119 65 - 140 mg/dL    Calcium 9 0 8 3 - 10 1 mg/dL    eGFR 100 ml/min/1 73sq m   Lipid Panel with Direct LDL reflex    Collection Time: 01/13/21  4:49 AM   Result Value Ref Range    Cholesterol 268 (H) 50 - 200 mg/dL    Triglycerides 179 (H) <=150 mg/dL    HDL, Direct 40 >=40 mg/dL    LDL Calculated 192 (H) 0 - 100 mg/dL   Hemoglobin A1c w/EAG Estimation    Collection Time: 01/13/21  4:49 AM   Result Value Ref Range    Hemoglobin A1C 5 2 Normal 3 8-5 6%; PreDiabetic 5 7-6 4%;  Diabetic >=6 5%; Glycemic control for adults with diabetes <7 0% %     mg/dl   Basic metabolic panel    Collection Time: 01/13/21  4:49 AM   Result Value Ref Range    Sodium 140 136 - 145 mmol/L    Potassium 3 9 3 5 - 5 3 mmol/L    Chloride 106 100 - 108 mmol/L    CO2 23 21 - 32 mmol/L    ANION GAP 11 4 - 13 mmol/L    BUN 14 5 - 25 mg/dL    Creatinine 0 75 0 60 - 1 30 mg/dL    Glucose 101 65 - 140 mg/dL    Glucose, Fasting 101 (H) 65 - 99 mg/dL    Calcium 9 2 8 3 - 10 1 mg/dL eGFR 100 ml/min/1 73sq m   CBC (With Platelets)    Collection Time: 01/13/21  4:49 AM   Result Value Ref Range    WBC 5 65 4 31 - 10 16 Thousand/uL    RBC 4 91 3 88 - 5 62 Million/uL    Hemoglobin 15 6 12 0 - 17 0 g/dL    Hematocrit 44 2 36 5 - 49 3 %    MCV 90 82 - 98 fL    MCH 31 8 26 8 - 34 3 pg    MCHC 35 3 31 4 - 37 4 g/dL    RDW 12 4 11 6 - 15 1 %    Platelets 058 414 - 485 Thousands/uL    MPV 10 3 8 9 - 12 7 fL     Imaging: I have personally reviewed pertinent reports  EKG: NSR LAFB incomplete RBBB      Code Status: Level 1 - Full Code  Advance Directive and Living Will:      Power of :    POLST:      Counseling / Coordination of Care  Total floor / unit time spent today 45 minutes  Greater than 50% of total time was spent with the patient and / or family counseling and / or coordination of care

## 2021-01-13 NOTE — UTILIZATION REVIEW
Initial Clinical Review    Admission Orders (From admission, onward)     Ordered        01/13/21 1028  Inpatient Admission  Once         01/12/21 1338  Place in Observation  Once                   1/13  CHANGED to INPT status due to ongoing management of  Confirmed Stroke  Inpatient Admission Once     Question Answer   Level of Care Med Surg   Estimated length of stay More than 2 Midnights   Certification I certify that inpatient services are medically necessary for this patient for a duration of greater than two midnights  See H&P and MD Progress Notes for additional information about the patient's course of treatment  ED Arrival Information     Expected Arrival Acuity Means of Arrival Escorted By Service Admission Type    - 1/12/2021 09:02 Emergent Walk-In Family Member General Medicine Emergency    Arrival Complaint    visual impairment/left arm weakness/dizzy        Chief Complaint   Patient presents with    CVA/TIA-like Symptoms     c/o left visual blurriness, lightheadedness after experiencing a headache on Friday night  Pt also c/o on/off lower lip numbness and LUE "weirdness" since 0100 this morning  Pt denies HA or lip numbness at present time  Assessment/Plan:   63 yo male,  To ER from home,  Admitted OBS status for management of  STROKE  Presented w/blurry vision left eye and  LUE tingling  Exam reveals evidence of left homonymous hemianopsia  GCS =  15  NIH =  CTA head and neck revealed questionable early ischemia/infarct in the right PCA territory   Passed dysphagia assessment  Will admit on stroke pathway with frequent neuro cks, telemetry, daily ASA,   MRI brain, ECHO,  neuro consult, therapy evals,  Dysphagia assessment prior to po inake  1/12  NEURO:  PCA occlusion is suggestive of an embolic source, most likely cardioembolic  Echo pending  1/13   INTERNAL MED      Majority of his symptoms have resolved besides some left eye blurriness    Cont neuro cks, echo pending  Will need TIFFANY tomorrow AM   Cont asa/statin  New dx of hyperlipidemia:  Start Lipitor     1/12  gCS score stable at 15    ED Triage Vitals   Temperature Pulse Respirations Blood Pressure SpO2   01/12/21 0905 01/12/21 0905 01/12/21 0905 01/12/21 0905 01/12/21 0905   98 7 °F (37 1 °C) 78 18 (!) 187/99 96 %      Temp Source Heart Rate Source Patient Position - Orthostatic VS BP Location FiO2 (%)   01/12/21 0905 01/12/21 0905 01/12/21 0905 01/12/21 0905 --   Oral Monitor Lying Left arm       Pain Score       01/13/21 0335       No Pain          Wt Readings from Last 1 Encounters:   01/12/21 97 1 kg (214 lb)     Additional Vital Signs:   01/13/21 0802  98 2 °F   77  20  136/82  103  95 %  rm air    01/12/21 1544  97 4 °F    65  18  158/96    96 %   01/12/21 1450    85  16  185/90    96 %   01/12/21 1259    66  16  148/67    96 %       Pertinent Labs/Diagnostic Test Results:   1/12  EKG -  nsr w/incomplete RBBB - No ecg signs of ischemia/ injury    1/12  · CTA: "Questionable early ischemia/infarction in the right PCA territory involving the right medial occipital lobe   Follow-up MR imaging is recommended  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  Mild cervical carotid atherosclerotic disease "  · MRI: "Acute right occipital cortical infarcts with gyral swelling   No mass effect or hemorrhagic transformation  Redemonstrated occluded right PCA at proximal P2 segment   A few scattered foci of T2/FLAIR hyperintensity involving left frontal periventricular and subcortical white matter may represent sequela of chronic migraine disease versus mild microangiopathy "        Results from last 7 days   Lab Units 01/13/21  0449 01/12/21  1009   WBC Thousand/uL 5 65 7 79   HEMOGLOBIN g/dL 15 6 16 4   HEMATOCRIT % 44 2 47 5   PLATELETS Thousands/uL 186 206   NEUTROS ABS Thousands/µL  --  6 31         Results from last 7 days   Lab Units 01/13/21  0449 01/12/21  1009   SODIUM mmol/L 140 137   POTASSIUM mmol/L 3 9 4 4   CHLORIDE mmol/L 106 104   CO2 mmol/L 23 27   ANION GAP mmol/L 11 6   BUN mg/dL 14 11   CREATININE mg/dL 0 75 0 75   EGFR ml/min/1 73sq m 100 100   CALCIUM mg/dL 9 2 9 0             Results from last 7 days   Lab Units 01/13/21  0449 01/12/21  1009   GLUCOSE RANDOM mg/dL 101 119         Results from last 7 days   Lab Units 01/13/21  0449   HEMOGLOBIN A1C % 5 2   EAG mg/dl 103     ED Treatment:   Medication Administration from 01/12/2021 0902 to 01/12/2021 1530       Date/Time Order Dose Route Action     01/12/2021 1358 lactated ringers bolus 500 mL 500 mL Intravenous New Bag     01/12/2021 1255 LORazepam (ATIVAN) injection 0 5 mg 0 5 mg Intravenous Given     01/12/2021 1356 aspirin chewable tablet 324 mg 324 mg Oral Given        History reviewed  No pertinent past medical history  Present on Admission:   Cerebrovascular accident (CVA) due to embolism of right posterior cerebral artery (HCC)   Anxiety   Hyperlipidemia    Admitting Diagnosis: Visual complaint [H57 9]  Stroke-like symptoms [R29 90]  Symptoms of cerebrovascular accident (CVA) [R29 90]  Age/Sex: 62 y o  male    Admission Orders:  Admit  Tele ; Consult neurology; PT/OT/Speech   evals & treat  VS/Neuro cks q 1 hr x4;  q 2 hr x4;  q 4 hrs   Dysphagia assessment prior to po  I/O q shift ;  SCD's;  ECHO     Scheduled Medications:  aspirin, 81 mg, Oral, Daily  atorvastatin, 40 mg, Oral, QPM  enoxaparin, 40 mg, Subcutaneous, Daily      Continuous IV Infusions:     PRN Meds:  acetaminophen, 650 mg, Oral, Q6H PRN  LORazepam, 0 5 mg, Oral, Q8H PRN  ondansetron, 4 mg, Intravenous, Q6H PRN      Network Utilization Review Department  ATTENTION: Please call with any questions or concerns to [phone] and carefully listen to the prompts so that you are directed to the right person   All voicemails are confidential   Mat Bicker all requests for admission clinical reviews, approved or denied determinations and any other requests to dedicated fax number below belonging to the campus where the patient is receiving treatment   List of dedicated fax numbers for the Facilities:  [address] [place] [address] DENIALS (Administrative/Medical Necessity) [phone]   [address] (Maternity/NICU/Pediatrics) [phone] [address] Alva Massey [address] (  Dee Ng [facility]) [address] [place] [address] [address] [place] [address] [phone]

## 2021-01-14 ENCOUNTER — TELEPHONE (OUTPATIENT)
Dept: NEUROLOGY | Facility: CLINIC | Age: 61
End: 2021-01-14

## 2021-01-14 ENCOUNTER — ANESTHESIA EVENT (INPATIENT)
Dept: NON INVASIVE DIAGNOSTICS | Facility: HOSPITAL | Age: 61
DRG: 066 | End: 2021-01-14
Payer: COMMERCIAL

## 2021-01-14 ENCOUNTER — TELEPHONE (OUTPATIENT)
Dept: CARDIOLOGY CLINIC | Facility: CLINIC | Age: 61
End: 2021-01-14

## 2021-01-14 VITALS
HEIGHT: 72 IN | SYSTOLIC BLOOD PRESSURE: 134 MMHG | HEART RATE: 59 BPM | RESPIRATION RATE: 18 BRPM | OXYGEN SATURATION: 95 % | DIASTOLIC BLOOD PRESSURE: 79 MMHG | TEMPERATURE: 96.7 F

## 2021-01-14 VITALS — HEART RATE: 68 BPM

## 2021-01-14 LAB
ANION GAP SERPL CALCULATED.3IONS-SCNC: 11 MMOL/L (ref 4–13)
BASOPHILS # BLD AUTO: 0.04 THOUSANDS/ΜL (ref 0–0.1)
BASOPHILS NFR BLD AUTO: 1 % (ref 0–1)
BUN SERPL-MCNC: 17 MG/DL (ref 5–25)
CALCIUM SERPL-MCNC: 9 MG/DL (ref 8.3–10.1)
CHLORIDE SERPL-SCNC: 103 MMOL/L (ref 100–108)
CO2 SERPL-SCNC: 24 MMOL/L (ref 21–32)
CREAT SERPL-MCNC: 0.83 MG/DL (ref 0.6–1.3)
DEPRECATED AT III PPP: 95 % OF NORMAL (ref 92–136)
EOSINOPHIL # BLD AUTO: 0.08 THOUSAND/ΜL (ref 0–0.61)
EOSINOPHIL NFR BLD AUTO: 1 % (ref 0–6)
ERYTHROCYTE [DISTWIDTH] IN BLOOD BY AUTOMATED COUNT: 12.2 % (ref 11.6–15.1)
GFR SERPL CREATININE-BSD FRML MDRD: 96 ML/MIN/1.73SQ M
GLUCOSE SERPL-MCNC: 106 MG/DL (ref 65–140)
HCT VFR BLD AUTO: 46.2 % (ref 36.5–49.3)
HGB BLD-MCNC: 16.2 G/DL (ref 12–17)
IMM GRANULOCYTES # BLD AUTO: 0.01 THOUSAND/UL (ref 0–0.2)
IMM GRANULOCYTES NFR BLD AUTO: 0 % (ref 0–2)
LYMPHOCYTES # BLD AUTO: 1.54 THOUSANDS/ΜL (ref 0.6–4.47)
LYMPHOCYTES NFR BLD AUTO: 26 % (ref 14–44)
MCH RBC QN AUTO: 31.4 PG (ref 26.8–34.3)
MCHC RBC AUTO-ENTMCNC: 35.1 G/DL (ref 31.4–37.4)
MCV RBC AUTO: 90 FL (ref 82–98)
MONOCYTES # BLD AUTO: 0.74 THOUSAND/ΜL (ref 0.17–1.22)
MONOCYTES NFR BLD AUTO: 12 % (ref 4–12)
NEUTROPHILS # BLD AUTO: 3.6 THOUSANDS/ΜL (ref 1.85–7.62)
NEUTS SEG NFR BLD AUTO: 60 % (ref 43–75)
NRBC BLD AUTO-RTO: 0 /100 WBCS
PLATELET # BLD AUTO: 203 THOUSANDS/UL (ref 149–390)
PMV BLD AUTO: 10.3 FL (ref 8.9–12.7)
POTASSIUM SERPL-SCNC: 3.6 MMOL/L (ref 3.5–5.3)
PROT C AG ACT/NOR PPP IA: >150 % OF NORMAL (ref 60–150)
PROT S ACT/NOR PPP: 114 % (ref 71–117)
RBC # BLD AUTO: 5.16 MILLION/UL (ref 3.88–5.62)
SODIUM SERPL-SCNC: 138 MMOL/L (ref 136–145)
WBC # BLD AUTO: 6.01 THOUSAND/UL (ref 4.31–10.16)

## 2021-01-14 PROCEDURE — B24BZZ4 ULTRASONOGRAPHY OF HEART WITH AORTA, TRANSESOPHAGEAL: ICD-10-PCS | Performed by: INTERNAL MEDICINE

## 2021-01-14 PROCEDURE — 93312 ECHO TRANSESOPHAGEAL: CPT | Performed by: INTERNAL MEDICINE

## 2021-01-14 PROCEDURE — 99239 HOSP IP/OBS DSCHRG MGMT >30: CPT | Performed by: PHYSICIAN ASSISTANT

## 2021-01-14 PROCEDURE — 80048 BASIC METABOLIC PNL TOTAL CA: CPT | Performed by: PHYSICIAN ASSISTANT

## 2021-01-14 PROCEDURE — 93320 DOPPLER ECHO COMPLETE: CPT | Performed by: INTERNAL MEDICINE

## 2021-01-14 PROCEDURE — 93312 ECHO TRANSESOPHAGEAL: CPT

## 2021-01-14 PROCEDURE — 85025 COMPLETE CBC W/AUTO DIFF WBC: CPT | Performed by: PHYSICIAN ASSISTANT

## 2021-01-14 PROCEDURE — 93325 DOPPLER ECHO COLOR FLOW MAPG: CPT | Performed by: INTERNAL MEDICINE

## 2021-01-14 RX ORDER — LIDOCAINE HYDROCHLORIDE 10 MG/ML
INJECTION, SOLUTION EPIDURAL; INFILTRATION; INTRACAUDAL; PERINEURAL AS NEEDED
Status: DISCONTINUED | OUTPATIENT
Start: 2021-01-14 | End: 2021-01-14

## 2021-01-14 RX ORDER — SODIUM CHLORIDE, SODIUM LACTATE, POTASSIUM CHLORIDE, CALCIUM CHLORIDE 600; 310; 30; 20 MG/100ML; MG/100ML; MG/100ML; MG/100ML
INJECTION, SOLUTION INTRAVENOUS CONTINUOUS PRN
Status: DISCONTINUED | OUTPATIENT
Start: 2021-01-14 | End: 2021-01-14

## 2021-01-14 RX ORDER — ATORVASTATIN CALCIUM 40 MG/1
40 TABLET, FILM COATED ORAL EVERY EVENING
Qty: 30 TABLET | Refills: 0 | Status: SHIPPED | OUTPATIENT
Start: 2021-01-14

## 2021-01-14 RX ORDER — ASPIRIN 81 MG/1
81 TABLET, CHEWABLE ORAL DAILY
Qty: 30 TABLET | Refills: 0 | Status: SHIPPED | OUTPATIENT
Start: 2021-01-15

## 2021-01-14 RX ORDER — PROPOFOL 10 MG/ML
INJECTION, EMULSION INTRAVENOUS AS NEEDED
Status: DISCONTINUED | OUTPATIENT
Start: 2021-01-14 | End: 2021-01-14

## 2021-01-14 RX ADMIN — PROPOFOL 100 MG: 10 INJECTION, EMULSION INTRAVENOUS at 10:16

## 2021-01-14 RX ADMIN — PROPOFOL 25 MG: 10 INJECTION, EMULSION INTRAVENOUS at 10:21

## 2021-01-14 RX ADMIN — PROPOFOL 25 MG: 10 INJECTION, EMULSION INTRAVENOUS at 10:25

## 2021-01-14 RX ADMIN — PROPOFOL 50 MG: 10 INJECTION, EMULSION INTRAVENOUS at 10:24

## 2021-01-14 RX ADMIN — PROPOFOL 25 MG: 10 INJECTION, EMULSION INTRAVENOUS at 10:18

## 2021-01-14 RX ADMIN — PROPOFOL 50 MG: 10 INJECTION, EMULSION INTRAVENOUS at 10:17

## 2021-01-14 RX ADMIN — PROPOFOL 25 MG: 10 INJECTION, EMULSION INTRAVENOUS at 10:27

## 2021-01-14 RX ADMIN — PROPOFOL 50 MG: 10 INJECTION, EMULSION INTRAVENOUS at 10:20

## 2021-01-14 RX ADMIN — SODIUM CHLORIDE, SODIUM LACTATE, POTASSIUM CHLORIDE, AND CALCIUM CHLORIDE: .6; .31; .03; .02 INJECTION, SOLUTION INTRAVENOUS at 09:54

## 2021-01-14 RX ADMIN — LIDOCAINE HYDROCHLORIDE 70 MG: 10 INJECTION, SOLUTION EPIDURAL; INFILTRATION; INTRACAUDAL; PERINEURAL at 10:16

## 2021-01-14 RX ADMIN — ASPIRIN 81 MG CHEWABLE TABLET 81 MG: 81 TABLET CHEWABLE at 08:54

## 2021-01-14 RX ADMIN — PROPOFOL 25 MG: 10 INJECTION, EMULSION INTRAVENOUS at 10:23

## 2021-01-14 RX ADMIN — ENOXAPARIN SODIUM 40 MG: 40 INJECTION SUBCUTANEOUS at 08:54

## 2021-01-14 RX ADMIN — PROPOFOL 25 MG: 10 INJECTION, EMULSION INTRAVENOUS at 10:26

## 2021-01-14 NOTE — ANESTHESIA POSTPROCEDURE EVALUATION
Post-Op Assessment Note    CV Status:  Stable  Pain Score: 0    Pain management: adequate     Mental Status:  Alert and awake   Hydration Status:  Stable   PONV Controlled:  None   Airway Patency:  Patent      Post Op Vitals Reviewed: Yes      Staff: CRNA   Comments: AAOx3, SV Nonobstructed, VSS        No complications documented      BP  107/66   Temp      Pulse 74   Resp 22   SpO2 94

## 2021-01-14 NOTE — DISCHARGE SUMMARY
Discharge- Buffalo Mems 1960, 62 y o  male MRN: [id number]    Unit/Bed#: S -01 Encounter: [id number]    Primary Care Provider: Donny Yung DO   Date and time admitted to hospital: 1/12/2021  9:11 AM  * Cerebrovascular accident (CVA) due to embolism of right posterior cerebral artery ([address] )  Assessment & Plan  · Initially presented with headache, left eye visual abnormality and left arm weakness with some left lip tingling  Majority of his symptoms have resolved besides some left eye blurriness  · CTA: "Questionable early ischemia/infarction in the right PCA territory involving the right medial occipital lobe  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  Mild cervical carotid atherosclerotic disease "  · MRI: "Acute right occipital cortical infarcts with gyral swelling  No mass effect or hemorrhagic transformation  Redemonstrated occluded right PCA at proximal P2 segment  A few scattered foci of T2/FLAIR hyperintensity involving left frontal periventricular and subcortical white matter may represent sequela of chronic migraine disease versus mild microangiopathy "  o TTE unremarkable  o TIFFANY negative for clot  o Continue aspirin and statin   Possibly cardio-embolic, will need outpatient cardiology evaluation for loop   OT driving eval    Hyperlipidemia  Assessment & Plan  · New diagnosis, cholesterol 268, triglycerides 179, HDL 40,   · Start high-intensity statin, Lipitor 40 mg daily  · Will need outpatient monitoring with PCP  Anxiety  Assessment & Plan  · Extremely anxious, which is situational as he has not been in the hospital and is anxious about being in the hospital at this time  · Ativan p r n      Discharging Physician / Practitioner: Calvin Rojas PA-C  PCP: Donny Yung DO  Admission Date:   Admission Orders (From admission, onward)     Ordered        01/13/21 1028  Inpatient Admission Once         01/12/21 1338  Place in Observation  Once                   Discharge Date: 01/14/21    Resolved Problems  Date Reviewed: 1/14/2021    None          Consultations During Hospital Stay:  · Neurology    Procedures Performed:   · 2D echocardiogram  · TIFFANY  · MRI of the brain  · CT angiogram head and neck    Significant Findings / Test Results:   · As above    Incidental Findings:   · None     Test Results Pending at Discharge (will require follow up): · None     Outpatient Tests Requested:  · Loop recorder    Complications:  None    Reason for Admission:  Left eye blurriness    Hospital Course:     Michael Lai is a 62 y o  male patient who originally presented to the hospital on 1/12/2021 due to left eye blurriness, headache, left lip tingling and left arm numbness  Patient was seen by Neurology and underwent workup including CT angiogram and subsequently MRI of the brain which did reveal evidence of right occipital infarcts  He was placed on aspirin and Lipitor  He had 2D echocardiogram and subsequently TIFFANY which were negative for any source of clot  He was recommended for an outpatient loop recorder but was otherwise stable for discharge  Driving evaluation by OT was also advised  Please see above list of diagnoses and related plan for additional information  Condition at Discharge: good     Discharge Day Visit / Exam:     Subjective:  Patient reports he is feeling well and his vision is better  When I evaluated him in the morning his only complaint was that he was very anxious about the TIFFANY  He reports that he plans to eat and work out more  Vitals: Blood Pressure: 134/79 (01/14/21 1059)  Pulse: 59 (01/14/21 1059)  Temperature: (!) 96 7 °F (35 9 °C) (01/14/21 1059)  Temp Source: Temporal (01/14/21 1035)  Respirations: 18 (01/14/21 1059)  Height: 6' (182 9 cm) (01/12/21 0905)  SpO2: 95 % (01/14/21 1059)  Exam:   Physical Exam  Vitals signs reviewed     Constitutional:       General: He is not in acute distress  Appearance: Normal appearance  He is normal weight  He is not ill-appearing, toxic-appearing or diaphoretic  HENT:      Head: Normocephalic  Nose: No congestion  Eyes:      General: No scleral icterus  Right eye: No discharge  Left eye: No discharge  Conjunctiva/sclera: Conjunctivae normal       Pupils: Pupils are equal, round, and reactive to light  Cardiovascular:      Rate and Rhythm: Normal rate and regular rhythm  Heart sounds: No murmur  Pulmonary:      Effort: Pulmonary effort is normal  No respiratory distress  Breath sounds: Normal breath sounds  No stridor  No wheezing or rhonchi  Chest:      Chest wall: No tenderness  Abdominal:      General: There is no distension  Tenderness: There is no guarding  Musculoskeletal:      Right lower leg: No edema  Left lower leg: No edema  Skin:     General: Skin is warm and dry  Coloration: Skin is not jaundiced or pale  Findings: No bruising, erythema, lesion or rash  Neurological:      General: No focal deficit present  Mental Status: He is alert  Comments: Awake alert interactive no evidence of confusion  Patient seen sitting in bed  No focal deficits   Psychiatric:      Comments: Mildly anxious appearing, but very pleasant and cooperative         Discussion with Family: wife over phone    Discharge instructions/Information to patient and family:   See after visit summary for information provided to patient and family  Provisions for Follow-Up Care:  See after visit summary for information related to follow-up care and any pertinent home health orders  Disposition: home    Planned Readmission: none     Discharge Statement:  I spent 35 minutes discharging the patient  This time was spent on the day of discharge  I had direct contact with the patient on the day of discharge   Greater than 50% of the total time was spent examining patient, answering all patient questions, arranging and discussing plan of care with patient as well as directly providing post-discharge instructions  Additional time then spent on discharge activities  Case was discussed with nursing, case management, and PT    Discharge Medications:  See after visit summary for reconciled discharge medications provided to patient and family        ** Please Note: This note has been constructed using a voice recognition system **

## 2021-01-14 NOTE — DISCHARGE INSTRUCTIONS
Regular Diet   WHAT YOU NEED TO KNOW:   A regular diet is a healthy meal plan that includes a variety of healthy foods from all the food groups  Follow this meal plan if you do not have any health conditions that require a special diet  A healthy meal plan is low in unhealthy fats, salt, and added sugar  It may decrease your risk of heart disease, osteoporosis (brittle bones), and some types of cancer  DISCHARGE INSTRUCTIONS:   Healthy meal plan:  My Plate is a model for planning healthy meals  It shows the types and amounts of foods that should go on your plate  Fruits and vegetables make up about half of your plate, and grains and protein make up the other half  A serving of dairy is also included  The amount of calories and serving sizes you need depends on your age, gender, weight, and height  Examples of healthy foods are listed below:  · Eat a variety of vegetables  such as dark green, red, and orange vegetables  You can also include canned vegetables low in sodium (salt) and frozen vegetables without added butter or sauces  · Eat a variety of fresh fruits , canned fruit in 100% juice, frozen fruit, and dried fruit  · Include whole grains  At least half of the grains you eat should be whole grains  Examples include whole wheat bread, wheat pasta, brown rice, and whole grain cereals such as oatmeal     · Eat a variety of protein foods such as seafood (fish and shellfish), lean meat, and poultry without skin (turkey and chicken)  Examples of lean meats include pork leg, shoulder, or tenderloin, and beef round, sirloin, tenderloin, and extra lean ground beef  Other protein foods include eggs and egg substitutes, beans, peas, soy products, nuts, and seeds  · Choose low-fat dairy products such as skim or 1% milk or low-fat yogurt, cheese, and cottage cheese       Foods to limit:   · Vegetables with added fat such as [place] fries, or vegetables with cream sauces or topped with cheese    · Fruit with added sugar such as canned fruit in heavy syrup or frozen fruit with added sugar     · Carbohydrates high in fat and sugar  such as cookies, donuts, croissants, store-bought muffins, or other high-fat breads    · Protein foods with added fat  such as fried meats, seafood, or poultry, or those served with high-fat gravies and sauces    · High-fat protein foods  such as t-bone steaks, ribs, chicken or turkey with skin, hot dogs, and sausage    · High-fat dairy products  such as cream cheese, regular hard cheeses, regular and premium ice cream, or whole and 2% milk    · Unhealthy fats  such as butter, hard margarine, and shortening    Other guidelines:   · Choose and prepare foods with less salt and added sugars  Use the nutrition information on food labels to help you make healthy choices  The percent daily value listed on the food label tells you whether a food is low or high in certain nutrients  A percent daily value of 5% or less means that the food is low in a nutrient  A percent daily value of 20% or more means that the food is high in a nutrient  · Get enough fiber  by regularly eating foods high in fiber  Good sources include fruits, vegetables, whole grains, beans, and peas  · Limit foods high in unhealthy fats  such as cholesterol, saturated fat, and trans fat  Foods high in cholesterol and saturated fat include hamburger, mcnamara, chicken or turkey skin, whole milk, and butter  Foods high in trans fat include packaged foods such as potato chips and cookies  It is also found in hard margarine, some fried foods, and shortening  · Limit alcohol  Women should limit alcohol to 1 drink a day  Men should limit alcohol to 2 drinks a day  A drink of alcohol is 12 ounces of beer, 5 ounces of wine, or 1½ ounces of liquor  © Copyright [address] Information is for End User's use only and may not be sold, redistributed or otherwise used for commercial purposes   All illustrations and images included in [address] are the copyrighted property of A D A M , Inc  or [facility] Joel Schafer   The above information is an  only  It is not intended as medical advice for individual conditions or treatments  Talk to your doctor, nurse or pharmacist before following any medical regimen to see if it is safe and effective for you

## 2021-01-14 NOTE — ANESTHESIA PREPROCEDURE EVALUATION
Procedure:  TIFFANY    Relevant Problems   CARDIO   (+) Cerebrovascular accident (CVA) due to embolism of right posterior cerebral artery (HCC)   (+) Hyperlipidemia      NEURO/PSYCH   (+) Anxiety   (+) Cerebrovascular accident (CVA) due to embolism of right posterior cerebral artery ([address] )     1/14/21 TTE  SUMMARY     LEFT VENTRICLE:  Systolic function was normal  Ejection fraction was estimated to be 60 %  There were no regional wall motion abnormalities      RIGHT VENTRICLE:  The size was normal   Systolic function was normal     Results for Valerie Peguero (MRN [id number]) as of 1/14/2021 09:54   Ref  Range 1/14/2021 04:49   WBC Latest Ref Range: 4 31 - 10 16 Thousand/uL 6 01   Red Blood Cell Count Latest Ref Range: 3 88 - 5 62 Million/uL 5 16   Hemoglobin Latest Ref Range: 12 0 - 17 0 g/dL 16 2   HCT Latest Ref Range: 36 5 - 49 3 % 46 2   MCV Latest Ref Range: 82 - 98 fL 90   MCH Latest Ref Range: 26 8 - 34 3 pg 31 4   MCHC Latest Ref Range: 31 4 - 37 4 g/dL 35 1   RDW Latest Ref Range: 11 6 - 15 1 % 12 2   Platelet Count Latest Ref Range: 149 - 390 Thousands/uL 203   MPV Latest Ref Range: 8 9 - 12 7 fL 10 3   nRBC Latest Units: /100 WBCs 0     HPhysical Exam    Airway    Mallampati score: III  TM Distance: >3 FB  Neck ROM: full     Dental   Comment: Denies any loose teeth ,     Cardiovascular      Pulmonary      Other Findings        Anesthesia Plan  ASA Score- 3     Anesthesia Type- IV sedation with anesthesia with ASA Monitors  Additional Monitors:   Airway Plan:     Comment: Plan for IV sedation with GETA as back up          Plan Factors-    Chart reviewed  EKG reviewed  Imaging results reviewed  Existing labs reviewed  Patient summary reviewed  Induction- intravenous  Postoperative Plan- Plan for postoperative opioid use  Planned trial extubation    Informed Consent- Anesthetic plan and risks discussed with patient  I personally reviewed this patient with the CRNA   Discussed and agreed on the Anesthesia Plan with the CHRIS Posey

## 2021-01-14 NOTE — TELEPHONE ENCOUNTER
Have you ever been seen by outpatient Neurology? No    Do you have a location preference? Patient prefers to be seen in [facility]    Do you prefer morning or afternoon? NA     Was your hospital stay due to a work or MVA related injury? No  (if yes, fill out)    If yes name of Insurance company:    Date of Injury:                   Open Claim  : None      If yes Claim #     Name     [address] phone #    Type of Insurance? [place]       Appointment Scheduled for: 04-05-21 1:15pm with Jc Stubbs

## 2021-01-14 NOTE — TELEPHONE ENCOUNTER
Patient called to schedule OV or loop implant  Patient had CVA of right posterior cerebral artery  1/12/21  Loop was recommended to r/o afib at cause  After speaking with Dr Oscar Hooper, it was decided that patient does not need an office visit prior to loop, but will set up an appointment if loop shows any afib  I will call patient and inform our schedulers of this after patient agrees

## 2021-01-14 NOTE — DISCHARGE INSTR - AVS FIRST PAGE
Dear Niecy Chowdary,     It was our pleasure to care for you here at [facility]  It is our hope that we were always able to exceed the expected standards for your care during your stay  You were hospitalized due to stroke  You were cared for on the 4th floor by Adrianna Astudillo PA-C under the service of Tavia Kingston MD with the [facility] who covers for your primary care physician (PCP), Paris Vaca DO, while you were hospitalized  If you have any questions or concerns related to this hospitalization, you may contact us at [phone]  For follow up as well as any medication refills, we recommend that you follow up with your primary care physician  A registered nurse will reach out to you by phone within a few days after your discharge to answer any additional questions that you may have after going home  However, at this time we provide for you here, the most important instructions / recommendations at discharge:     · Notable Medication Adjustments -   · Aspirin and lipitor for stroke  · Testing Required after Discharge -   ·   · Important follow up information -   · loop recorder to be implanted at cardiology office  · Other Instructions -   · Healthy diet  · Please review this entire after visit summary as additional general instructions including medication list, appointments, activity, diet, any pertinent wound care, and other additional recommendations from your care team that may be provided for you        Sincerely,     Adrianna Astudillo PA-C

## 2021-01-14 NOTE — UTILIZATION REVIEW
Please note that this member is now INPATIENT    Notification of Inpatient Admission/Inpatient Authorization Request   This is a Notification of Inpatient Admission for Ana Paula  Be advised that this patient was admitted to our facility under Inpatient Status  Contact Sarah Hampton at [phone] for additional admission information  [facility] DE[facility]  DEDICATED [phone]  Patient Name:   Merline [place]   YOB: 1960       [address]   [address]:   Amara Sylvester  Tax ID: [id number]  NPI: [id number] Attending Provider/NPI:  Address:  Phone: Arsenio Medina, Nidia Lorenzo [[id number]]  Same as the facility  [phone]   Place of Service Code: 24 Place of Service Name:  [address] [place] [address]   Start Date: 21 1028     Discharge Date & Time: No discharge date for patient encounter  Type of Admission: Inpatient Status Discharge Disposition   (if discharged): Final discharge disposition not confirmed   Patient Diagnoses: Visual complaint [H57 9]  Stroke-like symptoms [R29 90]  Symptoms of cerebrovascular accident (CVA) [R29 90]     Orders: Admission Orders (From admission, onward)     Ordered        21 1028  Inpatient Admission  Once         21 1338  Place in Observation  Once                    Assigned Utilization Review Contact: Sarah Hampton  Utilization   Network Utilization Review Department  Phone: [phone]; Fax [phone]  Email: [email or web address]   ATTENTION PAYERS: Please call the assigned Utilization  directly with any questions or concerns ALL voicemails in the department are confidential  Send all requests for admission clinical reviews, approved or denied determinations and any other requests to dedicated fax number belonging to the campus where the patient is receiving treatment

## 2021-01-15 ENCOUNTER — TELEPHONE (OUTPATIENT)
Dept: CARDIOLOGY CLINIC | Facility: CLINIC | Age: 61
End: 2021-01-15

## 2021-01-15 LAB
CARDIOLIPIN IGA SER IA-ACNC: <9 APL U/ML (ref 0–11)
CARDIOLIPIN IGG SER IA-ACNC: <9 GPL U/ML (ref 0–14)
CARDIOLIPIN IGM SER IA-ACNC: <9 MPL U/ML (ref 0–12)

## 2021-01-15 NOTE — UTILIZATION REVIEW
Initial Clinical Review    Admission Orders (From admission, onward)     Ordered        01/13/21 1028  Inpatient Admission  Once         01/12/21 1338  Place in Observation  Once                   1/13  CHANGED to INPT status due to ongoing management of  Confirmed Stroke  Inpatient Admission Once     Question Answer   Level of Care Med Surg   Estimated length of stay More than 2 Midnights   Certification I certify that inpatient services are medically necessary for this patient for a duration of greater than two midnights  See H&P and MD Progress Notes for additional information about the patient's course of treatment  ED Arrival Information     Expected Arrival Acuity Means of Arrival Escorted By Service Admission Type    - 1/12/2021 09:02 Emergent Walk-In Family Member General Medicine Emergency    Arrival Complaint    visual impairment/left arm weakness/dizzy        Chief Complaint   Patient presents with    CVA/TIA-like Symptoms     c/o left visual blurriness, lightheadedness after experiencing a headache on Friday night  Pt also c/o on/off lower lip numbness and LUE "weirdness" since 0100 this morning  Pt denies HA or lip numbness at present time  Assessment/Plan:   61 yo male,  To ER from home,  Admitted OBS status for management of  STROKE  Presented w/blurry vision left eye and  LUE tingling  Exam reveals evidence of left homonymous hemianopsia  GCS =  15  NIH =  CTA head and neck revealed questionable early ischemia/infarct in the right PCA territory   Passed dysphagia assessment  Will admit on stroke pathway with frequent neuro cks, telemetry, daily ASA,   MRI brain, ECHO,  neuro consult, therapy evals,  Dysphagia assessment prior to po inake  1/12  NEURO:  PCA occlusion is suggestive of an embolic source, most likely cardioembolic  Echo pending  1/13   INTERNAL MED      Majority of his symptoms have resolved besides some left eye blurriness    Cont neuro cks, echo pending  Will need TIFFANY tomorrow AM   Cont asa/statin  New dx of hyperlipidemia:  Start Lipitor     1/12  gCS score stable at 15    ED Triage Vitals   Temperature Pulse Respirations Blood Pressure SpO2   01/12/21 0905 01/12/21 0905 01/12/21 0905 01/12/21 0905 01/12/21 0905   98 7 °F (37 1 °C) 78 18 (!) 187/99 96 %      Temp Source Heart Rate Source Patient Position - Orthostatic VS BP Location FiO2 (%)   01/12/21 0905 01/12/21 0905 01/12/21 0905 01/12/21 0905 --   Oral Monitor Lying Left arm       Pain Score       01/13/21 0335       No Pain          Wt Readings from Last 1 Encounters:   01/12/21 97 1 kg (214 lb)     Additional Vital Signs:   01/13/21 0802  98 2 °F   77  20  136/82  103  95 %  rm air    01/12/21 1544  97 4 °F    65  18  158/96    96 %   01/12/21 1450    85  16  185/90    96 %   01/12/21 1259    66  16  148/67    96 %       Pertinent Labs/Diagnostic Test Results:   1/12  EKG -  nsr w/incomplete RBBB - No ecg signs of ischemia/ injury    1/12  · CTA: "Questionable early ischemia/infarction in the right PCA territory involving the right medial occipital lobe   Follow-up MR imaging is recommended  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  Mild cervical carotid atherosclerotic disease "  · MRI: "Acute right occipital cortical infarcts with gyral swelling   No mass effect or hemorrhagic transformation  Redemonstrated occluded right PCA at proximal P2 segment   A few scattered foci of T2/FLAIR hyperintensity involving left frontal periventricular and subcortical white matter may represent sequela of chronic migraine disease versus mild microangiopathy "        Results from last 7 days   Lab Units 01/14/21  0449 01/13/21  1812 01/13/21  0449 01/12/21  1009   WBC Thousand/uL 6 01  --  5 65 7 79   HEMOGLOBIN g/dL 16 2  --  15 6 16 4   HEMATOCRIT % 46 2  --  44 2 47 5   PLATELETS Thousands/uL 203 213 186 206   NEUTROS ABS Thousands/µL 3 60  --   --  6 31         Results from last 7 days   Lab Units 01/14/21  0449 01/13/21  0449 01/12/21  1009   SODIUM mmol/L 138 140 137   POTASSIUM mmol/L 3 6 3 9 4 4   CHLORIDE mmol/L 103 106 104   CO2 mmol/L 24 23 27   ANION GAP mmol/L 11 11 6   BUN mg/dL 17 14 11   CREATININE mg/dL 0 83 0 75 0 75   EGFR ml/min/1 73sq m 96 100 100   CALCIUM mg/dL 9 0 9 2 9 0             Results from last 7 days   Lab Units 01/14/21  0449 01/13/21  0449 01/12/21  1009   GLUCOSE RANDOM mg/dL 106 101 119         Results from last 7 days   Lab Units 01/13/21  0449   HEMOGLOBIN A1C % 5 2   EAG mg/dl 103     ED Treatment:   Medication Administration from 01/12/2021 0902 to 01/12/2021 1530       Date/Time Order Dose Route Action     01/12/2021 1358 lactated ringers bolus 500 mL 500 mL Intravenous New Bag     01/12/2021 1255 LORazepam (ATIVAN) injection 0 5 mg 0 5 mg Intravenous Given     01/12/2021 1356 aspirin chewable tablet 324 mg 324 mg Oral Given        History reviewed  No pertinent past medical history  Present on Admission:   Cerebrovascular accident (CVA) due to embolism of right posterior cerebral artery ([facility] [address] )   Anxiety   Hyperlipidemia    Admitting Diagnosis: Visual complaint [H57 9]  Stroke-like symptoms [R29 90]  Symptoms of cerebrovascular accident (CVA) [R29 90]  Age/Sex: 62 y o  male    Admission Orders:  Admit  Tele ; Consult neurology; PT/OT/Speech   evals & treat  VS/Neuro cks q 1 hr x4;  q 2 hr x4;  q 4 hrs   Dysphagia assessment prior to po  I/O q shift ;  SCD's;  ECHO     Scheduled Medications:  No current facility-administered medications for this encounter  Continuous IV Infusions:  No current facility-administered medications for this encounter  PRN Meds:  No current facility-administered medications for this encounter       Network Utilization Review Department  ATTENTION: Please call with any questions or concerns to [phone] and carefully listen to the prompts so that you are directed to the right person  All voicemails are confidential   Dharmesh Pickett all requests for admission clinical reviews, approved or denied determinations and any other requests to dedicated fax number below belonging to the campus where the patient is receiving treatment   List of dedicated fax numbers for the Facilities:  [address] [place] [address] DENIALS (Administrative/Medical Necessity) [phone]   [address] (Maternity/NICU/Pediatrics) [phone]   [address] Alva Massey [address] (  Dee Diego "[address]" [address]) [address] [place] [address] [address] [place] [address] [phone]

## 2021-01-15 NOTE — TELEPHONE ENCOUNTER
PT is scheduled on 01/21/21 for a loop implant at [facility] with Dr Serjio Chin  PT aware of instructions  Can I have preauth please

## 2021-01-16 LAB
APTT SCREEN TO CONFIRM RATIO: 0.97 RATIO (ref 0–1.4)
CONFIRM APTT/NORMAL: 36.6 SEC (ref 0–55)
LA PPP-IMP: NORMAL
PROT S ACT/NOR PPP: 116 % (ref 57–157)
PROT S PPP-ACNC: 113 % (ref 60–150)
SCREEN APTT: 35.3 SEC (ref 0–51.9)
SCREEN DRVVT: 34 SEC (ref 0–47)
THROMBIN TIME: 18.5 SEC (ref 0–23)

## 2021-01-18 ENCOUNTER — TELEPHONE (OUTPATIENT)
Dept: NEUROLOGY | Facility: CLINIC | Age: 61
End: 2021-01-18

## 2021-01-18 LAB
B2 GLYCOPROT1 IGA SER-ACNC: <9 GPI IGA UNITS (ref 0–25)
B2 GLYCOPROT1 IGG SER-ACNC: <9 GPI IGG UNITS (ref 0–20)
B2 GLYCOPROT1 IGM SER-ACNC: <9 GPI IGM UNITS (ref 0–32)
F5 GENE MUT ANL BLD/T: NORMAL

## 2021-01-18 NOTE — TELEPHONE ENCOUNTER
Post CVA Discharge Follow Up    Hospitalization: 1/12-1/14  The purpose of this phone call is to assess patient's general wellbeing or for any assistance needed with follow-up care  Called [phone], reached wife Telma Mariee  She states to call the home number at [phone] ( added this number to his chart )  She also wanted to add that he is nervous about his blood pressures and plans to see his PCP about this 1/20 but would appreciate if I can talk with him about his  I did not divulge any patient informaiton to her, she willingly shared this information with me  Called patient at   I introduced myself and explained neurovascular nurse navigator role  Since discharge, he denies experiencing any new or worsening stroke-like symptoms  States he feels so viridiana that he is doing well  States he continues to be tired at this time  He reports that he feels his vision is back to baseline  States he is reading and training his vision again  Ambulation / ADLs:  Patient claims he is ambulating independently as well as preforming his own ADLs  Patient manages his own medications, appointments, and affairs  Appointments / Medication Review:  Patient scheduled with PCP 1/18, claims he is already following up regarding his loop recorder  Stroke hospital follow up appointment already scheduled 2/10  I reviewed medications with him  There have not been any medication changes since discharge from the hospital  Patient reports having no difficulties obtaining medications  Reports he is taking all as prescribed with no missed doses, medication side effects, or signs of bleeding  Risk Factors / Education:  Patient verbalizes clear understanding of stroke type, symptoms, personal risk factors and management, medications, and resources  Patient reports he monitors his BP at home  Reported average BP continues to be 156//83   Patient plans to follow up with PCP about this  States he has been managing their other risk factors and is a non smoker  States he rides bike trails and walks about 4 miles a day  States he is not drinking coffee at this time, has cut out cheese, and decreased his salt intake  States that he is very determined to prevent any further neurovascular events  I addressed all his questions  Patient then stated he is lightheaded at times but thinks this may be due to fatigue  He declines to follow up regarding this in the ED  States he wants to talk to his PCP about this on Wednesday  At the conclusion of the conversation, patient denies having any further questions or concerns

## 2021-01-18 NOTE — TELEPHONE ENCOUNTER
This went to review pending# [id number]  I don't know I will get this back by Wednesday    [employer] usually takes awhile

## 2021-01-19 LAB — F2 GENE MUT ANL BLD/T: NORMAL

## 2021-01-20 ENCOUNTER — TELEPHONE (OUTPATIENT)
Dept: DISCHARGE UNIT | Facility: HOSPITAL | Age: 61
End: 2021-01-20

## 2021-01-20 NOTE — TELEPHONE ENCOUNTER
Pt is rescheduled to 01/27/21 for loop implant at [facility] with Dr Juan Humphreys

## 2021-01-20 NOTE — UTILIZATION REVIEW
Case was not present on 1/20 Log  Please update us on the determination    Notification of Discharge  This is a Notification of Discharge from our facility [address]  Please be advised that this patient has been discharge from our facility  Below you will find the admission and discharge date and time including the patients disposition  PRESENTATION DATE: 1/12/2021  9:11 AM  OBS ADMISSION DATE:   IP ADMISSION DATE: 1/13/21 1028   DISCHARGE DATE: 1/14/2021 12:43 PM  DISPOSITION: Home/Self Care Home/Self Care   Admission Orders listed below:  Admission Orders (From admission, onward)     Ordered        01/13/21 1028  Inpatient Admission  Once         01/12/21 1338  Place in Observation  Once                   Please contact the UR Department if additional information is required to close this patient's authorization/case  [address] [facility] Utilization Review Department  Main: [phone] x carefully listen to the prompts  All voicemails are confidential   [email or web address]  Send all requests for admission clinical reviews, approved or denied determinations and any other requests to dedicated fax number below belonging to the campus where the patient is receiving treatment   List of dedicated fax numbers:  [address] [place] [address] DENIALS (Administrative/Medical Necessity) [phone]   [address] (Maternity/NICU/Pediatrics) [phone]   [address] [facility] Box[facility] [phone]   Mary Phipps [phone]   Yamileth Nieto [phone]   [address] [phone]   [facility]  [phone]   [address] [place] [address], [address]  [address] [facility] 1000 [facility] [phone]     '

## 2021-01-21 NOTE — TELEPHONE ENCOUNTER
His Loop implant [id number] on 1/27/21 at [facility] was approved    Auth# [id number]  1/21/21-7/21/21

## 2021-01-26 ENCOUNTER — TELEPHONE (OUTPATIENT)
Dept: SURGERY | Facility: HOSPITAL | Age: 61
End: 2021-01-26

## 2021-01-27 ENCOUNTER — HOSPITAL ENCOUNTER (OUTPATIENT)
Dept: NON INVASIVE DIAGNOSTICS | Facility: HOSPITAL | Age: 61
Discharge: HOME | End: 2021-01-27
Admitting: NURSE PRACTITIONER
Payer: COMMERCIAL

## 2021-01-27 VITALS
SYSTOLIC BLOOD PRESSURE: 129 MMHG | HEART RATE: 68 BPM | OXYGEN SATURATION: 97 % | DIASTOLIC BLOOD PRESSURE: 75 MMHG | TEMPERATURE: 99.2 F | RESPIRATION RATE: 18 BRPM

## 2021-01-27 DIAGNOSIS — I63.431 CEREBROVASCULAR ACCIDENT (CVA) DUE TO EMBOLISM OF RIGHT POSTERIOR CEREBRAL ARTERY (HCC): ICD-10-CM

## 2021-01-27 PROCEDURE — 33285 INSJ SUBQ CAR RHYTHM MNTR: CPT

## 2021-01-27 PROCEDURE — NC001 PR NO CHARGE: Performed by: PHYSICIAN ASSISTANT

## 2021-01-27 PROCEDURE — C1764 EVENT RECORDER, CARDIAC: HCPCS

## 2021-01-27 PROCEDURE — 33285 INSJ SUBQ CAR RHYTHM MNTR: CPT | Performed by: INTERNAL MEDICINE

## 2021-01-27 RX ORDER — LIDOCAINE HYDROCHLORIDE 10 MG/ML
INJECTION, SOLUTION EPIDURAL; INFILTRATION; INTRACAUDAL; PERINEURAL CODE/TRAUMA/SEDATION MEDICATION
Status: COMPLETED | OUTPATIENT
Start: 2021-01-27 | End: 2021-01-27

## 2021-01-27 RX ORDER — LOSARTAN POTASSIUM 50 MG/1
50 TABLET ORAL DAILY
COMMUNITY

## 2021-01-27 RX ADMIN — LIDOCAINE HYDROCHLORIDE 10 ML: 10 INJECTION, SOLUTION EPIDURAL; INFILTRATION; INTRACAUDAL; PERINEURAL at 13:08

## 2021-01-27 NOTE — DISCHARGE INSTRUCTIONS
Keep loop recorder incision dry for one week  Do not use lotions/powders/creams on incision  Remove outer bandage 48 hours after procedure - if present, leave underlying steri-strips in place, they will either fall off on their own or will be removed at 2 week follow up appointment  Please call the office [phone] if you notice redness, swelling, bleeding, or drainage from incision or if you develop fevers  Cardiac Loop Recorder Insertion      WHAT YOU SHOULD KNOW:    A cardiac loop recorder is a device used to diagnose heart rhythm problems, such as a fast or irregular heartbeat  It is implanted in your left chest, just under the skin  The device records a pattern of your heart's rhythm, called an EKG  Your device records automatic EKGs, depending on how your caregiver programs it  You may also receive a handheld controller  You press a button on the controller when you have symptoms, such as dizziness, lightheadedness, or palpitations  The device will record an EKG at that moment  The recording can help your caregiver see if your symptoms may be caused by heart rhythm problems  Your caregiver will remove the device after it has collected enough data  You may need the device for up to 3 years  The procedure to remove the device is similar to the procedure used to implant it       AFTER YOU LEAVE:    Follow up with your cardiologist as directed: You will need to return in 1 to 2 weeks  Your cardiologist will check your incision  He may also program your device settings again  He will retrieve data from the device every 1 to 3 months with a monitor held over your skin  You may be able to transmit data from your device from home as well  You will do this by calling a number provided by your cardiologist, or as they have instructed you  Ask for information about this process  Write down your questions so you remember to ask them during your visits       Wound care: Keep loop recorder incision dry for one week  Do not use lotions/powders/creams on incision  Remove outer bandage 48 hours after procedure - leave underlying steri-strips in place, they will either fall off on their own or will be removed at 2 week follow up appointment  Please call the office if you notice redness, swelling, bleeding, or drainage from incision or if you develop fevers  After that first week, carefully wash your incision with soap and water  Keep the area clean and dry until it heals       Return to activity: If you received anesthesia, you will not be able to drive for 24 hours  Otherwise, most people can return to normal activities soon after the procedure  Your cardiologist may want to know if your work involves electrical current or high-voltage equipment  Ask about other electrical items that could interfere with your cardiac loop recorder       Contact your cardiologist if:   · You have a fever or chills  · Your wound is red, swollen, or draining pus  · You have questions or concerns about your condition or care  Seek care immediately or call 911 if:   · You feel weak, dizzy, or faint  · You lose consciousness  © 2014 [address] [employer] [address] [employer] is for End User's use only and may not be sold, redistributed or otherwise used for commercial purposes  All illustrations and images included in CareNotes® are the copyrighted property of A D A M [employer] Inc  or Yvon Early  The above information is an  only  It is not intended as medical advice for individual conditions or treatments  Talk to your doctor, nurse or pharmacist before following any medical regimen to see if it is safe and effective for you

## 2021-01-27 NOTE — H&P
H&P Exam - Cardiology   Hussain Post 62 y o  male MRN: [id number]  Unit/Bed#: P3 PROCEDURE 1 Encounter: [id number]    Assessment/Plan   1  Cryptogenic CVA    * patient with recent admission to \[facility]\"" due to cryptogenic CVA of right PCA with loop being recommended, plan for implant and discharge home post         Imaging: I have personally reviewed pertinent reports  Results for orders placed during the hospital encounter of 21   Echo complete with contrast if indicated    Narrative [facility] [address], [address] [facility]  [phone]    Transthoracic Echocardiogram  2D, M-mode, Doppler, and Color Doppler    Study date:  2021    Patient: Albert Teixeira  MR number: [id number]  Account number: [de-identified]  : 1960  Age: 62 years  Gender: Male  Status: Inpatient  Location: Bedside  Height: 72 in  Weight: 213 6 lb  BP: 110/ 65 mmHg    Indications: CVA    Diagnoses: I63 9 - Cerebral infarction, unspecified    Sonographer:  ROBI Barber  Primary Physician:  Teresa Mccoy DO  Referring Physician:  Yuri Chow PA-C  Group:  [facility]  Interpreting Physician:  Luigi Banerjee MD    SUMMARY    LEFT VENTRICLE:  Systolic function was normal  Ejection fraction was estimated to be 60 %  There were no regional wall motion abnormalities  RIGHT VENTRICLE:  The size was normal   Systolic function was normal     HISTORY: PRIOR HISTORY: HLD    PROCEDURE: The procedure was performed at the bedside  This was a routine study  The transthoracic approach was used  The study included complete 2D imaging, M-mode, complete spectral Doppler, and color Doppler  The heart rate was 76 bpm,  at the start of the study  Images were obtained from the parasternal, apical, subcostal, and suprasternal notch acoustic windows  Image quality was adequate  LEFT VENTRICLE: Size was normal  Systolic function was normal  Ejection fraction was estimated to be 60 %  There were no regional wall motion abnormalities  Wall thickness was normal  DOPPLER: There was an increased relative contribution  of atrial contraction to ventricular filling  Left ventricular diastolic function parameters were normal for the patient's age  RIGHT VENTRICLE: The size was normal  Systolic function was normal  Wall thickness was normal     LEFT ATRIUM: Size was normal     RIGHT ATRIUM: Size was normal     MITRAL VALVE: Valve structure was normal  There was normal leaflet separation  DOPPLER: The transmitral velocity was within the normal range  There was no evidence for stenosis  There was trace regurgitation  AORTIC VALVE: The valve was trileaflet  Leaflets exhibited normal thickness, mild calcification, normal cuspal separation, and sclerosis  DOPPLER: Transaortic velocity was within the normal range  There was no evidence for stenosis  There  was no significant regurgitation  TRICUSPID VALVE: The valve structure was normal  There was normal leaflet separation  DOPPLER: The transtricuspid velocity was within the normal range  There was no evidence for stenosis  There was trace regurgitation  PULMONIC VALVE: Leaflets exhibited normal thickness, no calcification, and normal cuspal separation  DOPPLER: The transpulmonic velocity was within the normal range  There was trace regurgitation  PERICARDIUM: There was no pericardial effusion  The pericardium was normal in appearance  AORTA: The root exhibited normal size  SYSTEMIC VEINS: IVC: The inferior vena cava was normal in size   Respirophasic changes were normal     SYSTEM MEASUREMENT TABLES    2D  %FS: 32 59 %  %FS: 32 59 %  Ao Diam: 3 25 cm  Ao Diam: 3 25 cm  Ao asc: 3 6 cm  Ao asc: 3 6 cm  EDV(Teich): 92 75 ml  EDV(Teich): 92 75 ml  EF(Teich): 61 1 %  EF(Teich): 61 1 %  ESV(Teich): 36 08 ml  ESV(Teich): 36 08 ml  IVSd: 0 86 cm  IVSd: 0 86 cm  LA Area: 15 79 cm2  LA Area: 15 79 cm2  LA Diam: 3 28 cm  LA Diam: 3 28 cm  LVEDV MOD A4C: 52 11 ml  LVEDV MOD A4C: 52 11 ml  LVEF MOD A4C: 70 86 %  LVEF MOD A4C: 70 86 %  LVESV MOD A4C: 15 19 ml  LVESV MOD A4C: 15 19 ml  LVIDd: 4 51 cm  LVIDd: 4 51 cm  LVIDs: 3 04 cm  LVIDs: 3 04 cm  LVLd A4C: 7 58 cm  LVLd A4C: 7 58 cm  LVLs A4C: 6 13 cm  LVLs A4C: 6 13 cm  LVPWd: 0 94 cm  LVPWd: 0 94 cm  RA Area: 13 59 cm2  RA Area: 13 59 cm2  RVIDd: 4 43 cm  RVIDd: 4 43 cm  SV MOD A4C: 36 93 ml  SV MOD A4C: 36 93 ml  SV(Teich): 56 67 ml  SV(Teich): 56 67 ml    MM  TAPSE: 2 98 cm    PW  E' Sept: 0 09 m/s  E/E' Sept: 9 49  MV A Sergio: 0 87 m/s  MV A Sergio: 0 87 m/s  MV Dec [place]: 4 22 m/s2  MV Dec [place]: 4 22 m/s2  MV DecT: 202 4 ms  MV DecT: 202 4 ms  MV E Sergio: 0 85 m/s  MV E Sergio: 0 85 m/s  MV E/A Ratio: 0 99  MV PHT: 58 7 ms  MVA By PHT: 3 75 cm2    [address] Accredited Echocardiography Laboratory    Prepared and electronically signed by    Marybeth Castillo MD  Signed 13-Jan-2021 12:26:03         EKG:   none    History of Present Illness   HPI:  Kaylie Falk is a 62y o  year old male with a history as above who presents to [facility] for ILR implant  Briefly this a patient who was recently hospitalized with cryptogenic CVA of right PCA with negative TIFFANY findings  Due to this ILR was recommended for AF monitoring  Review of Systems  ROS as noted above, otherwise 12 point review of systems was performed and is negative  Historical Information   No past medical history on file  No past surgical history on file  Family History: No family history on file      Social History   Social History     Substance and Sexual Activity   Alcohol Use Not Currently     Social History     Substance and Sexual Activity   Drug Use Never     Social History     Tobacco Use   Smoking Status Never Smoker   Smokeless Tobacco Never Used         Meds/Allergies   all medications and allergies reviewed  Home Medications:   Medications Prior to Admission   Medication    aspirin 81 mg chewable tablet    atorvastatin (LIPITOR) 40 mg tablet    losartan (COZAAR) 50 mg tablet       No Known Allergies    Objective   Vitals: Blood pressure 129/75, pulse 68, temperature 99 2 °F (37 3 °C), temperature source Oral, resp  rate 18, SpO2 97 %  Orthostatic Blood Pressures      Most Recent Value   Blood Pressure  129/75 filed at 01/27/2021 1239   Patient Position - Orthostatic VS  Sitting filed at 01/27/2021 1239          No intake or output data in the 24 hours ending 01/27/21 1307    Invasive Devices     None                 Physical Exam  Constitutional:       Appearance: He is well-developed  HENT:      Head: Normocephalic and atraumatic  Eyes:      Pupils: Pupils are equal, round, and reactive to light  Neck:      Musculoskeletal: Normal range of motion and neck supple  Cardiovascular:      Rate and Rhythm: Normal rate and regular rhythm  Pulmonary:      Effort: Pulmonary effort is normal       Breath sounds: Normal breath sounds  Abdominal:      General: Bowel sounds are normal       Palpations: Abdomen is soft  Musculoskeletal: Normal range of motion  Skin:     General: Skin is warm and dry  Neurological:      Mental Status: He is alert and oriented to person, place, and time  Lab Results: I have personally reviewed pertinent lab results                Invalid input(s): LABGLOM                Code Status: Prior

## 2021-02-10 ENCOUNTER — TELEPHONE (OUTPATIENT)
Dept: NEUROLOGY | Facility: CLINIC | Age: 61
End: 2021-02-10

## 2021-02-10 ENCOUNTER — OFFICE VISIT (OUTPATIENT)
Dept: NEUROLOGY | Facility: CLINIC | Age: 61
End: 2021-02-10
Payer: COMMERCIAL

## 2021-02-10 VITALS
SYSTOLIC BLOOD PRESSURE: 130 MMHG | DIASTOLIC BLOOD PRESSURE: 70 MMHG | WEIGHT: 208 LBS | BODY MASS INDEX: 28.21 KG/M2 | HEART RATE: 66 BPM

## 2021-02-10 DIAGNOSIS — Z86.73 HISTORY OF CVA (CEREBROVASCULAR ACCIDENT): Primary | ICD-10-CM

## 2021-02-10 PROCEDURE — 99214 OFFICE O/P EST MOD 30 MIN: CPT | Performed by: PHYSICIAN ASSISTANT

## 2021-02-10 NOTE — PROGRESS NOTES
Patient ID: Cherie Kirby is a 62 y o  male  Assessment:  Patient presented on 1/12/21 with HA, left eye visual abnormality  He was found to have a acute right occipital cortical infarcts in setting of P2 occlusion  His BP was elevated on arrival,   TTE and TIFFANY unremarkable, no thrombus or PFO identified  Thrombosis panel negative  He was started on ASA and statin and Loop recorder advised  He is currently doing well and denies any new neurologic symptoms to indicate recurrent TIA / CVA  Loop recorder was placed on 01/27/2021  His license was reported to [employer] due to left visual field deficit, although this had even improved in the hospital   He says he saw an optometrist, although it does not sound like he had a formal visual field exam by his description of what was done at the visit  He tells me that the optometrist did fill out his portion of the [employer] form, and he is asking me to complete the general neurological form today  Discussed that I need to have records from the ophthalmologist, but they would be the one to clear him to drive from a visual standpoint  Otherwise, I have no issue with him driving  Has also an appointment with Dr Gayle Spangler later this week, who is a retinal specialist, as patient himself requested this visit  Discussed that if atrial fibrillation is found on his loop recorder, he would require full anticoagulation  For now, he can continue aspirin and statin and make sure to follow-up with PCP on management of his cardiovascular risk factors including blood pressure, lipids and blood sugar  Reminded him that goal BP is less than 130/80 on a routine basis  He is monitoring at home      Plan:   -for ongoing stroke prevention, patient will continue his combination of aspirin 81 mg daily, Lipitor 40 mg daily, along with appropriate blood pressure control   -will defer management of blood pressure, lipids and blood sugar to his PCP   -if any Afib is found on loop recorder, will require full anticoagulation   -I have requested records from his optometrist and also for the upcoming visit with Dr Felicitas Toledo  Clearance for driving from a visual standpoint needs to come from Ophthalmology after a formal visual field exam   If cleared, then I can fill out the general neurological form  I would have no other issues with him driving, as no other deficits from his CVA and his exam is normal today    -heart healthy diet and routine exercise as tolerated   -follow-up in 4 months with vascular Neurology attending or sooner if needed     Signs and symptoms of stroke were discussed with the patient and his wife in detail and included in his AVS today  Diagnoses and all orders for this visit:    History of CVA (cerebrovascular accident)           Subjective:    ONELIA Marie is a 62 y o  male with PMH of anxiety who presents today for a hospital follow up  Patient [facility] ED on 01/12/2021 with blurry vision of left eye  Patient reported that on Saturday 01/09/2021 he developed significant blurriness in left eye, with some blurriness in right eye, as well as a headache  Patient reported the headache was behind his right eye and in the posterior aspect his head on the right  He states that the headache was 4/10 in severity and improved with Advil  Patient states he felt his blurry vision also improved with the Advil  Patient reports that headache was only present on Saturday 01/09, however his blurry vision has persisted  Patient reports he developed left upper extremity numbness on 01/12/2021, which improved by the time he got to the ED  He had not seen a PCP in the last 2 years  BP was 187/99 in the ED  CTA head and neck revealed questionable early ischemia/infarct in the right PCA territory involving right medial occipital lobe, as well as abrupt termination of the right proximal P2 segment of the posterior cerebral artery   On neuro eval patient initially denied any visual deficits, however on examination patient realized he had left visual field deficits  A1C 5 2  Lipid panel ,   MRI brain demonstrated foci of acute cortical infarcts in the right occipital lobe  redemonstrated occluded right PCA at proximal P2 segment  ECHO with EF 60%, no regional wall motion abnormalities  No atrial dilation  TIFFANY with no PFO or intracradiac thrombus  Thrombosis panel unremarkable  he was started on ASA 81mg daily and Lipitor 40mg daily and Loop recorder was advised  His visual field loss was reported to [facility] and he was advised follow up with ophthalmology for driving clearance  Today, patient reports he is doing well  He is with his wife today  He denies any new neurologic symptoms at this time  He feels his vision is back to baseline  He did receive a notice from [facility] to turn in his license  He is not driving  He saw an eye doctor, Dr Jessie Wilks, who appears to be an optometrist   No records available  It does not seem he had a visual field exam done  He says the doctor filled out his respective [employer] form and said there is no issue with him driving  Patient is requesting I [facility] the [facility]  Patient notes he is going to see Dr Matti Madrid, who is a retinal specialist, later this week  He notes he has always had bad anxiety, and it did increase since his stroke , but now starting to calm down  He has seen his PCP  He had the Loop recorder placed on 1/27/21  The following portions of the patient's history were reviewed and updated as appropriate: current medications, past family history, past medical history, past social history, past surgical history and problem list          Objective:    Blood pressure 130/70, pulse 66, weight 94 3 kg (208 lb)  Physical Exam  Constitutional:       Appearance: Normal appearance  He is well-developed     HENT:      Head: Normocephalic and atraumatic  Eyes:      Extraocular Movements: EOM normal       Pupils: Pupils are equal, round, and reactive to light  Pulmonary:      Effort: Pulmonary effort is normal    Skin:     General: Skin is warm and dry  Neurological:      Mental Status: He is alert  Coordination: Coordination is intact  Deep Tendon Reflexes: Strength normal and reflexes are normal and symmetric  Psychiatric:         Mood and Affect: Mood normal          Speech: Speech normal          Behavior: Behavior normal          Neurological Exam  Mental Status  Alert  Oriented to person, place, time and situation  Speech is normal  Language is fluent with no aphasia  Attention and concentration are normal     Cranial Nerves  CN II: Visual fields full to confrontation  CN III, IV, VI: Extraocular movements intact bilaterally  Pupils equal round and reactive to light bilaterally  CN V: Facial sensation is normal   CN VII: Full and symmetric facial movement  CN VIII: Hearing is normal   CN IX, X: Palate elevates symmetrically  CN XI: Shoulder shrug strength is normal   CN XII: Tongue midline without atrophy or fasciculations  Motor   Normal muscle tone  Strength is 5/5 throughout all four extremities  Sensory  Light touch is normal in upper and lower extremities  Reflexes  Deep tendon reflexes are 2+ and symmetric in all four extremities with downgoing toes bilaterally  Coordination  Finger-to-nose, rapid alternating movements and heel-to-shin normal bilaterally without dysmetria  Gait  Casual gait is normal including stance, stride, and arm swing  ROS:    Review of Systems   Constitutional: Negative  Negative for appetite change and fever  HENT: Negative  Negative for hearing loss, tinnitus, trouble swallowing and voice change  Eyes: Negative  Negative for photophobia and pain  Respiratory: Negative  Negative for shortness of breath  Cardiovascular: Negative  Negative for palpitations  Gastrointestinal: Negative  Negative for nausea and vomiting  Endocrine: Negative  Negative for cold intolerance  Genitourinary: Negative  Negative for dysuria, frequency and urgency  Musculoskeletal: Negative  Negative for myalgias and neck pain  Skin: Negative  Negative for rash  Neurological: Negative  Negative for dizziness, tremors, seizures, syncope, facial asymmetry, speech difficulty, weakness, light-headedness, numbness and headaches  Hematological: Negative  Does not bruise/bleed easily  Psychiatric/Behavioral: Negative for confusion, hallucinations and sleep disturbance  The patient is nervous/anxious        I personally reviewed and updated the ROS as appropriate

## 2021-02-10 NOTE — PATIENT INSTRUCTIONS
Continue aspirin 81mg daily and Lipitor 40mg daily for secondary stroke prevention  Continue to follow with PCP for management of blood pressure, cholesterol and blood sugar   Will continue to monitor the loop recorder  If any Afib is found, you would need full anticoagulation  Follow up in 4-6 months or sooner if needed    If you experience any facial droop, weakness on one side of the body, speech or swallowing difficulty, painless loss of vision in one eye, double vision, vertigo that does not resolve quickly/imbalance, go to the ER/call 911

## 2021-02-10 NOTE — TELEPHONE ENCOUNTER
Forms printed and placed in clinical bin for completion  Lawerance Kris - agreeable to completion of general neurological form?

## 2021-02-10 NOTE — TELEPHONE ENCOUNTER
I already have a copy of the form and started to fill this out  I need to await his ophthalmology records  We deferred driving clearance to optho, as his only deficit from the stroke was a left visual field deficit  Vee Diallo has already requested records and he sees another optho on Friday  I can take care of the form, as I already have it in my possession and nearly completed  Thanks!

## 2021-02-10 NOTE — TELEPHONE ENCOUNTER
Patient presents with [employer] GENERAL NEUROLOGICAL FORM aware 5 00 fee please allow up to 14 business days to complete here today for HFU  Scanned into chart dropped charged pd 5 00 please call [phone] once complete please send copy thru my chart, Ed aware we will fax to mandy luis

## 2021-02-15 ENCOUNTER — IN-CLINIC DEVICE VISIT (OUTPATIENT)
Dept: CARDIOLOGY CLINIC | Facility: CLINIC | Age: 61
End: 2021-02-15
Payer: COMMERCIAL

## 2021-02-15 DIAGNOSIS — Z95.818 PRESENCE OF OTHER CARDIAC IMPLANTS AND GRAFTS: Primary | ICD-10-CM

## 2021-02-15 PROCEDURE — 93291 INTERROG DEV EVAL SCRMS IP: CPT | Performed by: INTERNAL MEDICINE

## 2021-02-15 NOTE — PROGRESS NOTES
Results for orders placed or performed in visit on 02/15/21   Cardiac EP device report    Narrative    MDT [address] [place] [address] INTERROGATED IN THE [facility] OFFICE (LOOP): WOUND CHECK: INCISION CLEAN AND DRY WITH EDGES APPROXIMATED; SUTURES REMOVED; WOUND CARE AND RESTRICTIONS REVIEWED WITH PATIENT  BATTERY VOLTAGE "GOOD"  PRESENTING RHYTHM SHOWS NSR ~ 72 BPM  NO PATIENT OR DEVICE ACTIVATED EPISODES  NORMAL DEVICE FUNCTION     EBS

## 2021-02-19 ENCOUNTER — TELEPHONE (OUTPATIENT)
Dept: NEUROLOGY | Facility: CLINIC | Age: 61
End: 2021-02-19

## 2021-02-19 NOTE — TELEPHONE ENCOUNTER
I have not received any records from his ophthalmology visits, which is what I am waiting form  I believe we requested the records  Kelly Murphy, can you confirm is records were requested from optho?

## 2021-02-19 NOTE — TELEPHONE ENCOUNTER
Patient's wife Mayra Delcid is asking if [employer] forms have been completed for patient  I see a blank form in the chart but I dont see anything where is was completed and scanned back into chart  Can you please review?

## 2021-02-22 NOTE — TELEPHONE ENCOUNTER
Call placed to Dr Mock's office  They are unable to release records to me without patient consent  He must sign records release  Call placed to [facility]  Informed her whatever they can do to assist with obtaining information would be appreciated as this is what we are waiting for before [employer] forms will be completed

## 2021-02-22 NOTE — TELEPHONE ENCOUNTER
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   Thanks

## 2021-02-22 NOTE — TELEPHONE ENCOUNTER
Called Dr Tonya Orozco at [phone] - VERONIKA was faxed to office on 2/10/21 to [phone]  (VERONIKA is scanned into media) Spoke to [employer] and she stated that Feliberto Lesches from medical records has been out and is the one who would be sending the information  I confirmed the fax number but [employer] could not confirm that the VERONIKA was received  Feliberto Lesches is to be in the office tomorrow, but she has been out for a while now

## 2021-02-22 NOTE — TELEPHONE ENCOUNTER
Patients wife Suha Triplett called and asked if the forms were filled out and if she can pick them up after they are faxed  There are forms attached from ophto, but i'm not sure if that's what is needed

## 2021-02-22 NOTE — TELEPHONE ENCOUNTER
Mona Marroquin - records from [employer] scanned to chart under media dated 2/16/2021  Wife called again asking if forms were completed  Is this what you were looking for?

## 2021-02-23 NOTE — TELEPHONE ENCOUNTER
Call received from patients wife  Following up to see if we had received records from Dr Mock's office  Only received visual fields test, not office note  Call placed to Dr Mock's office  Requested office note from 2/12/2021 also be faxed  Awaiting fax

## 2021-02-24 NOTE — TELEPHONE ENCOUNTER
Faxed forms and scanned into chart with confirmation of the fax  I also mailed the form with the confirmation to patient

## 2021-02-24 NOTE — TELEPHONE ENCOUNTER
I received the note from Dr Elvira Cintron  It is hand written and the copy is VERY poor quality  It does not appear he had any major deficits on exam   I filled out the general neurologic form that the patient gave to me and left in on [address] [facility] desk in [facility] for her to fax back to Nellie this afternoon when she gets to the office      Nursing--Please let patient/wife know I have complete the form and we will fax to Nellie Amor--Please fax form to Nellie and scan into chart (on your desk in [facility])

## 2021-02-24 NOTE — TELEPHONE ENCOUNTER
Called patient and he is aware that I will fax the form once I return to the [facility] office this afternoon

## 2021-02-24 NOTE — TELEPHONE ENCOUNTER
Spoke to wife  Informed of previous  She is asking for a copy of the [employer] form be mailed to her for their records as well  Byron lott - once to office, can you please also mail a copy of form to patient at home address on file? Thank you!

## 2021-03-10 DIAGNOSIS — Z23 ENCOUNTER FOR IMMUNIZATION: ICD-10-CM

## 2021-06-02 ENCOUNTER — REMOTE DEVICE CLINIC VISIT (OUTPATIENT)
Dept: CARDIOLOGY CLINIC | Facility: CLINIC | Age: 61
End: 2021-06-02
Payer: COMMERCIAL

## 2021-06-02 DIAGNOSIS — Z95.818 PRESENCE OF OTHER CARDIAC IMPLANTS AND GRAFTS: Primary | ICD-10-CM

## 2021-06-02 PROCEDURE — 93298 REM INTERROG DEV EVAL SCRMS: CPT | Performed by: INTERNAL MEDICINE

## 2021-06-02 PROCEDURE — G2066 INTER DEVC REMOTE 30D: HCPCS | Performed by: INTERNAL MEDICINE

## 2021-06-02 NOTE — PROGRESS NOTES
Results for orders placed or performed in visit on 06/02/21   Cardiac EP device report    Narrative     [facility]: BATTERY STATUS "GOOD"  NO DEVICE DETECTED & NO PT ACTIVATED EPISODES  PRESENTING RHYTHM NSR  NORMAL DEVICE FUNCTION   GV

## 2021-06-10 ENCOUNTER — TELEPHONE (OUTPATIENT)
Dept: NEUROLOGY | Facility: CLINIC | Age: 61
End: 2021-06-10

## 2021-06-29 ENCOUNTER — OFFICE VISIT (OUTPATIENT)
Dept: NEUROLOGY | Facility: CLINIC | Age: 61
End: 2021-06-29
Payer: COMMERCIAL

## 2021-06-29 VITALS
HEART RATE: 78 BPM | WEIGHT: 202 LBS | HEIGHT: 72 IN | BODY MASS INDEX: 27.36 KG/M2 | SYSTOLIC BLOOD PRESSURE: 120 MMHG | DIASTOLIC BLOOD PRESSURE: 70 MMHG

## 2021-06-29 DIAGNOSIS — E78.5 HYPERLIPIDEMIA, UNSPECIFIED HYPERLIPIDEMIA TYPE: Primary | ICD-10-CM

## 2021-06-29 DIAGNOSIS — Z86.73 HISTORY OF CVA (CEREBROVASCULAR ACCIDENT): ICD-10-CM

## 2021-06-29 PROCEDURE — 99215 OFFICE O/P EST HI 40 MIN: CPT | Performed by: PSYCHIATRY & NEUROLOGY

## 2021-06-29 NOTE — PROGRESS NOTES
Patient ID: Genaro Green is a 62 y o  male  Assessment/Plan: This is a 61 y/o Male who is here as a follow up for history of CVA  PLAN:      Diagnoses and all orders for this visit:    Hyperlipidemia, unspecified hyperlipidemia type  -continue with Lipitor for stroke prevention  History of CVA (cerebrovascular accident)  -for stroke prevention continue with aspirin and atorvastatin   -BP goal < 130/80, BP is at goal in the office  Counseling -   -does not smoke at this time   -denies any history of snoring    -I advised patient to avoid using NSAIDs for headaches or other pain and to stick to tylenol if needed  -Recommend mediterranean diet & regular exercise regimen atleast 4-5 times a week for 20-30 minutes  -I educated patient/family regarding medication compliance       Follow up -  1 year  I would be happy to see the patient sooner if any new questions/concerns arise  Patient/Guardian was advised to the call the office if they have any questions and concerns in the meantime  Patient/Guardian does understand that if they have any new stroke like symptoms such as facial droop on one side, weakness/paralysis on either side, speech trouble, numbness on one side, balance issues, any vision changes, extreme dizziness or any new headache, to call 9-1-1 immediately or to proceed to the nearest ER immediately  Face to face time spent 25 minutes  Greater than 50% of total time was spent with the patient and or family counseling and or coordination of care  Non face to face time spent 16 minutes  Subjective:    HPI    This is a 61 y/o M who is here as a follow up for history of CVA (multiple embolic strokes)  Patient is doing well  He denies any new TIA/CVA like symptoms  He says that left eye is back to normal   He did not need any therapy  He is complaint with aspirin and atorvastatin for stroke prevention   He says that he had a hard time adjusting to BP medications, and is now able to tolerate it  The following portions of the patient's history were reviewed and updated as appropriate:   He  has no past medical history on file  He   Patient Active Problem List    Diagnosis Date Noted    Hyperlipidemia 01/13/2021    History of CVA (cerebrovascular accident) 01/12/2021    Anxiety 01/12/2021     He  has no past surgical history on file  His family history is not on file  He  reports that he has never smoked  He has never used smokeless tobacco  He reports previous alcohol use  He reports that he does not use drugs  Current Outpatient Medications   Medication Sig Dispense Refill    aspirin 81 mg chewable tablet Chew 1 tablet (81 mg total) daily 30 tablet 0    atorvastatin (LIPITOR) 40 mg tablet Take 1 tablet (40 mg total) by mouth every evening 30 tablet 0    losartan (COZAAR) 50 mg tablet Take 50 mg by mouth daily       No current facility-administered medications for this visit  Current Outpatient Medications on File Prior to Visit   Medication Sig    aspirin 81 mg chewable tablet Chew 1 tablet (81 mg total) daily    atorvastatin (LIPITOR) 40 mg tablet Take 1 tablet (40 mg total) by mouth every evening    losartan (COZAAR) 50 mg tablet Take 50 mg by mouth daily     No current facility-administered medications on file prior to visit  He has No Known Allergies          Objective:    Blood pressure 120/70, pulse 78, height 6' (1 829 m), weight 91 6 kg (202 lb)  Physical Exam  General - Patient is alert, awake, oriented to time, place and person, follows commands  Speech - no dysarthria noted, no aphasia noted  Neuro:   Cranial nerves: PERRL, EOMI, facial sensation intact, able to raise eyebrows symmetrically, cannot assess facial droop and tongue deviation due to face mask requirement  Motor: 5/5 throughout, normal tone, no pronator drift noted     Sensory - intact to soft touch throughout  Reflexes - 2+ throughout  Coordination - no ataxia/dysmetria noted  Gait - normal     ROS:  I personally reviewed ROS   Review of Systems   Constitutional: Negative  Negative for appetite change and fever  HENT: Negative  Negative for hearing loss, tinnitus, trouble swallowing and voice change  Eyes: Negative  Negative for photophobia and pain  Respiratory: Negative  Negative for shortness of breath  Cardiovascular: Negative  Negative for palpitations  Gastrointestinal: Negative  Negative for nausea and vomiting  Endocrine: Negative  Negative for cold intolerance  Genitourinary: Negative  Negative for dysuria, frequency and urgency  Musculoskeletal: Negative  Negative for myalgias and neck pain  Skin: Negative  Negative for rash  Neurological: Negative  Negative for dizziness, tremors, seizures, syncope, facial asymmetry, speech difficulty, weakness, light-headedness, numbness and headaches  Hematological: Negative  Does not bruise/bleed easily  Psychiatric/Behavioral: Negative  Negative for confusion, hallucinations and sleep disturbance

## 2021-08-19 ENCOUNTER — REMOTE DEVICE CLINIC VISIT (OUTPATIENT)
Dept: CARDIOLOGY CLINIC | Facility: CLINIC | Age: 61
End: 2021-08-19
Payer: COMMERCIAL

## 2021-08-19 DIAGNOSIS — Z95.818 PRESENCE OF OTHER CARDIAC IMPLANTS AND GRAFTS: Primary | ICD-10-CM

## 2021-08-19 PROCEDURE — G2066 INTER DEVC REMOTE 30D: HCPCS | Performed by: INTERNAL MEDICINE

## 2021-08-19 PROCEDURE — 93298 REM INTERROG DEV EVAL SCRMS: CPT | Performed by: INTERNAL MEDICINE

## 2021-08-19 NOTE — PROGRESS NOTES
MDT LNQ22/ ACTIVE SYSTEM IS MRI CONDITIONAL   [employer] TRANSMISSION: LOOP RECORDER  PRESENTING RHYTHM SB @ 42 BPM  BATTERY STATUS "OK " NO PATIENT OR DEVICE ACTIVATED EPISODES  NORMAL DEVICE FUNCTION   DL

## 2021-09-27 ENCOUNTER — TELEPHONE (OUTPATIENT)
Dept: OTHER | Facility: OTHER | Age: 61
End: 2021-09-27

## 2021-09-27 NOTE — TELEPHONE ENCOUNTER
"I had a problem over the weekend  I passed out from heat at the [facility]  I was hospitalized overnight  They suggested that I follow-up with my neurologist "     Please call the patient to schedule and appointment

## 2021-10-06 ENCOUNTER — TELEPHONE (OUTPATIENT)
Dept: OTHER | Facility: OTHER | Age: 61
End: 2021-10-06

## 2022-06-27 ENCOUNTER — TELEPHONE (OUTPATIENT)
Dept: NEUROLOGY | Facility: CLINIC | Age: 62
End: 2022-06-27

## 2022-07-12 ENCOUNTER — OFFICE VISIT (OUTPATIENT)
Dept: NEUROLOGY | Facility: CLINIC | Age: 62
End: 2022-07-12
Payer: COMMERCIAL

## 2022-07-12 VITALS — DIASTOLIC BLOOD PRESSURE: 70 MMHG | HEART RATE: 77 BPM | SYSTOLIC BLOOD PRESSURE: 126 MMHG

## 2022-07-12 DIAGNOSIS — E78.5 HYPERLIPIDEMIA: ICD-10-CM

## 2022-07-12 DIAGNOSIS — Z86.73 HISTORY OF CVA (CEREBROVASCULAR ACCIDENT): Primary | ICD-10-CM

## 2022-07-12 PROCEDURE — 99213 OFFICE O/P EST LOW 20 MIN: CPT | Performed by: PSYCHIATRY & NEUROLOGY

## 2022-07-12 NOTE — PROGRESS NOTES
Patient ID: Simba Callejas is a 65 y o  male  Assessment/Plan: This is a 64 y/o M who is here as a follow up for history of CVA  PLAN:      Diagnoses and all orders for this visit:    History of CVA (cerebrovascular accident)  -for stroke prevention continue with combination of aspirin and lipitor  -BP goal < 130/80, BP is at goal in the office  -LDL goal <70  -does not smoke at this time   -no snoring  -I advised patient to avoid using NSAIDs for headaches or other pain and to stick to tylenol if needed  -Recommend mediterranean diet & regular exercise regimen atleast 4-5 times a week for 20-30 minutes  -I educated patient/family regarding medication compliance    Hyperlipidemia       Follow up - PRN    I would be happy to see the patient sooner if any new questions/concerns arise  Patient/Guardian was advised to the call the office if they have any questions and concerns in the meantime  Patient/Guardian does understand that if they have any new stroke like symptoms such as facial droop on one side, weakness/paralysis on either side, speech trouble, numbness on one side, balance issues, any vision changes, extreme dizziness or any new headache, to call 9-1-1 immediately or to proceed to the nearest ER immediately  Subjective:    HPI    This is a 64 y/o M who is here as a follow up for history of CVA  Patient is doing well overall  Denies any new TIA/Cerebrovascular accident like symptoms  Patient is compliant with medications here  He is tolerating them well without any issues  Does not have any issues with medications  He states that he does not snore  He has been very active and works out 5 to 6 times a week he states  He also eats pretty healthy and leads a healthy lifestyle on states that he eats cm and about 2 to 3 times a week  Is currently driving has had no issues  Does not have any residual deficits from his stroke      The following portions of the patient's history were reviewed and updated as appropriate:   He  has no past medical history on file  He   Patient Active Problem List    Diagnosis Date Noted    Hyperlipidemia 01/13/2021    History of CVA (cerebrovascular accident) 01/12/2021    Anxiety 01/12/2021     He  has no past surgical history on file  His family history is not on file  He  reports that he has never smoked  He has never used smokeless tobacco  He reports previous alcohol use  He reports that he does not use drugs  Current Outpatient Medications   Medication Sig Dispense Refill    aspirin 81 mg chewable tablet Chew 1 tablet (81 mg total) daily 30 tablet 0    atorvastatin (LIPITOR) 40 mg tablet Take 1 tablet (40 mg total) by mouth every evening 30 tablet 0    losartan (COZAAR) 50 mg tablet Take 50 mg by mouth daily       No current facility-administered medications for this visit  Current Outpatient Medications on File Prior to Visit   Medication Sig    aspirin 81 mg chewable tablet Chew 1 tablet (81 mg total) daily    atorvastatin (LIPITOR) 40 mg tablet Take 1 tablet (40 mg total) by mouth every evening    losartan (COZAAR) 50 mg tablet Take 50 mg by mouth daily     No current facility-administered medications on file prior to visit  He has No Known Allergies          Objective:    Blood pressure 126/70, pulse 77  Physical Exam  General - Patient is alert, awake, oriented to time, place and person, follows commands    Neuro:   Cranial nerves: PERRL, EOMI, facial sensation intact, able to raise eyebrows symmetrically, cannot assess facial droop and tongue deviation due to face mask requirement  Motor: 5/5 throughout, normal tone, no pronator drift noted  Sensory - intact to soft touch throughout  Reflexes - 2+ throughout  Coordination - no ataxia/dysmetria noted  Gait - normal    ROS:  Reviewed ROS   Review of Systems   Constitutional: Negative for appetite change and fatigue     HENT: Negative for ear pain, tinnitus, trouble swallowing and voice change  Eyes: Negative for photophobia, pain and visual disturbance  Respiratory: Negative for shortness of breath  Cardiovascular: Negative for palpitations  Gastrointestinal: Negative for nausea and vomiting  Endocrine: Negative for cold intolerance  Genitourinary: Negative for dysuria, frequency and urgency  Musculoskeletal: Negative for gait problem, myalgias and neck pain  Skin: Negative for rash  Neurological: Negative for dizziness, tremors, seizures, syncope, facial asymmetry, speech difficulty, weakness, light-headedness, numbness and headaches  Hematological: Does not bruise/bleed easily  Psychiatric/Behavioral: Negative for behavioral problems, confusion, hallucinations and sleep disturbance  The patient is not nervous/anxious

## 2024-01-01 NOTE — ED NOTES
L arm numbness resolved  Anxiety decreased        Behzad Perez, RN  01/12/21 1098
Neurology at bedside     Kari Treadwell, [facility]  01/12/21 5766
medium/normal/dry and intact